# Patient Record
Sex: MALE | Race: WHITE | NOT HISPANIC OR LATINO | Employment: OTHER | URBAN - METROPOLITAN AREA
[De-identification: names, ages, dates, MRNs, and addresses within clinical notes are randomized per-mention and may not be internally consistent; named-entity substitution may affect disease eponyms.]

---

## 2023-10-01 ENCOUNTER — APPOINTMENT (OUTPATIENT)
Dept: CT IMAGING | Facility: HOSPITAL | Age: 78
DRG: 057 | End: 2023-10-01
Payer: COMMERCIAL

## 2023-10-01 ENCOUNTER — APPOINTMENT (EMERGENCY)
Dept: CT IMAGING | Facility: HOSPITAL | Age: 78
DRG: 057 | End: 2023-10-01
Payer: COMMERCIAL

## 2023-10-01 ENCOUNTER — HOSPITAL ENCOUNTER (INPATIENT)
Facility: HOSPITAL | Age: 78
LOS: 5 days | Discharge: HOME | DRG: 057 | End: 2023-10-07
Attending: STUDENT IN AN ORGANIZED HEALTH CARE EDUCATION/TRAINING PROGRAM | Admitting: INTERNAL MEDICINE
Payer: COMMERCIAL

## 2023-10-01 DIAGNOSIS — Z86.73 HISTORY OF CVA (CEREBROVASCULAR ACCIDENT): ICD-10-CM

## 2023-10-01 DIAGNOSIS — I65.09 VERTEBRAL ARTERY OCCLUSION: ICD-10-CM

## 2023-10-01 DIAGNOSIS — R47.81 SLURRED SPEECH: Primary | ICD-10-CM

## 2023-10-01 PROBLEM — E78.5 HYPERLIPIDEMIA: Status: ACTIVE | Noted: 2023-10-01

## 2023-10-01 PROBLEM — I50.20 HFREF (HEART FAILURE WITH REDUCED EJECTION FRACTION) (HCC): Status: ACTIVE | Noted: 2023-10-01

## 2023-10-01 PROBLEM — I10 ESSENTIAL HYPERTENSION: Status: ACTIVE | Noted: 2019-06-27

## 2023-10-01 PROBLEM — N18.9 CKD (CHRONIC KIDNEY DISEASE): Status: ACTIVE | Noted: 2023-10-01

## 2023-10-01 PROBLEM — R29.90 STROKE-LIKE SYMPTOMS: Status: ACTIVE | Noted: 2023-10-01

## 2023-10-01 PROBLEM — E03.9 HYPOTHYROIDISM: Status: ACTIVE | Noted: 2023-07-27

## 2023-10-01 LAB
ANION GAP SERPL CALCULATED.3IONS-SCNC: 7 MMOL/L
APTT PPP: 32 SECONDS (ref 23–37)
BUN SERPL-MCNC: 31 MG/DL (ref 5–25)
CALCIUM SERPL-MCNC: 9.1 MG/DL (ref 8.4–10.2)
CARDIAC TROPONIN I PNL SERPL HS: 39 NG/L
CHLORIDE SERPL-SCNC: 105 MMOL/L (ref 96–108)
CO2 SERPL-SCNC: 27 MMOL/L (ref 21–32)
CREAT SERPL-MCNC: 1.27 MG/DL (ref 0.6–1.3)
ERYTHROCYTE [DISTWIDTH] IN BLOOD BY AUTOMATED COUNT: 13.2 % (ref 11.6–15.1)
FLUAV RNA RESP QL NAA+PROBE: NEGATIVE
FLUBV RNA RESP QL NAA+PROBE: NEGATIVE
GFR SERPL CREATININE-BSD FRML MDRD: 53 ML/MIN/1.73SQ M
GLUCOSE SERPL-MCNC: 142 MG/DL (ref 65–140)
GLUCOSE SERPL-MCNC: 144 MG/DL (ref 65–140)
HCT VFR BLD AUTO: 43.9 % (ref 36.5–49.3)
HGB BLD-MCNC: 14.9 G/DL (ref 12–17)
INR PPP: 0.98 (ref 0.84–1.19)
MCH RBC QN AUTO: 29.4 PG (ref 26.8–34.3)
MCHC RBC AUTO-ENTMCNC: 33.9 G/DL (ref 31.4–37.4)
MCV RBC AUTO: 87 FL (ref 82–98)
PLATELET # BLD AUTO: 241 THOUSANDS/UL (ref 149–390)
PMV BLD AUTO: 10.1 FL (ref 8.9–12.7)
POTASSIUM SERPL-SCNC: 3.7 MMOL/L (ref 3.5–5.3)
PROTHROMBIN TIME: 13.6 SECONDS (ref 11.6–14.5)
RBC # BLD AUTO: 5.07 MILLION/UL (ref 3.88–5.62)
RSV RNA RESP QL NAA+PROBE: NEGATIVE
SARS-COV-2 RNA RESP QL NAA+PROBE: NEGATIVE
SODIUM SERPL-SCNC: 139 MMOL/L (ref 135–147)
TSH SERPL DL<=0.05 MIU/L-ACNC: 5.25 UIU/ML (ref 0.45–4.5)
WBC # BLD AUTO: 6.75 THOUSAND/UL (ref 4.31–10.16)

## 2023-10-01 PROCEDURE — 36415 COLL VENOUS BLD VENIPUNCTURE: CPT | Performed by: STUDENT IN AN ORGANIZED HEALTH CARE EDUCATION/TRAINING PROGRAM

## 2023-10-01 PROCEDURE — 0241U HB NFCT DS VIR RESP RNA 4 TRGT: CPT | Performed by: STUDENT IN AN ORGANIZED HEALTH CARE EDUCATION/TRAINING PROGRAM

## 2023-10-01 PROCEDURE — 84443 ASSAY THYROID STIM HORMONE: CPT | Performed by: STUDENT IN AN ORGANIZED HEALTH CARE EDUCATION/TRAINING PROGRAM

## 2023-10-01 PROCEDURE — 80048 BASIC METABOLIC PNL TOTAL CA: CPT | Performed by: STUDENT IN AN ORGANIZED HEALTH CARE EDUCATION/TRAINING PROGRAM

## 2023-10-01 PROCEDURE — 70496 CT ANGIOGRAPHY HEAD: CPT

## 2023-10-01 PROCEDURE — 93005 ELECTROCARDIOGRAM TRACING: CPT

## 2023-10-01 PROCEDURE — 85730 THROMBOPLASTIN TIME PARTIAL: CPT | Performed by: STUDENT IN AN ORGANIZED HEALTH CARE EDUCATION/TRAINING PROGRAM

## 2023-10-01 PROCEDURE — 85027 COMPLETE CBC AUTOMATED: CPT | Performed by: STUDENT IN AN ORGANIZED HEALTH CARE EDUCATION/TRAINING PROGRAM

## 2023-10-01 PROCEDURE — 85610 PROTHROMBIN TIME: CPT | Performed by: STUDENT IN AN ORGANIZED HEALTH CARE EDUCATION/TRAINING PROGRAM

## 2023-10-01 PROCEDURE — 82948 REAGENT STRIP/BLOOD GLUCOSE: CPT

## 2023-10-01 PROCEDURE — 99223 1ST HOSP IP/OBS HIGH 75: CPT | Performed by: STUDENT IN AN ORGANIZED HEALTH CARE EDUCATION/TRAINING PROGRAM

## 2023-10-01 PROCEDURE — 84484 ASSAY OF TROPONIN QUANT: CPT | Performed by: STUDENT IN AN ORGANIZED HEALTH CARE EDUCATION/TRAINING PROGRAM

## 2023-10-01 PROCEDURE — 70450 CT HEAD/BRAIN W/O DYE: CPT

## 2023-10-01 PROCEDURE — G1004 CDSM NDSC: HCPCS

## 2023-10-01 PROCEDURE — 70498 CT ANGIOGRAPHY NECK: CPT

## 2023-10-01 PROCEDURE — 99285 EMERGENCY DEPT VISIT HI MDM: CPT

## 2023-10-01 PROCEDURE — 99285 EMERGENCY DEPT VISIT HI MDM: CPT | Performed by: STUDENT IN AN ORGANIZED HEALTH CARE EDUCATION/TRAINING PROGRAM

## 2023-10-01 RX ORDER — HEPARIN SODIUM 5000 [USP'U]/ML
5000 INJECTION, SOLUTION INTRAVENOUS; SUBCUTANEOUS EVERY 8 HOURS SCHEDULED
Status: DISCONTINUED | OUTPATIENT
Start: 2023-10-01 | End: 2023-10-07

## 2023-10-01 RX ORDER — CLOPIDOGREL BISULFATE 75 MG/1
300 TABLET ORAL ONCE
Status: DISCONTINUED | OUTPATIENT
Start: 2023-10-01 | End: 2023-10-07 | Stop reason: HOSPADM

## 2023-10-01 RX ORDER — LEVOTHYROXINE SODIUM 0.07 MG/1
75 TABLET ORAL
Status: DISCONTINUED | OUTPATIENT
Start: 2023-10-02 | End: 2023-10-07 | Stop reason: HOSPADM

## 2023-10-01 RX ORDER — CLOPIDOGREL BISULFATE 75 MG/1
75 TABLET ORAL DAILY
Status: DISCONTINUED | OUTPATIENT
Start: 2023-10-02 | End: 2023-10-02

## 2023-10-01 RX ORDER — ASPIRIN 81 MG/1
81 TABLET, CHEWABLE ORAL DAILY
Status: DISCONTINUED | OUTPATIENT
Start: 2023-10-02 | End: 2023-10-07 | Stop reason: HOSPADM

## 2023-10-01 RX ORDER — LISINOPRIL AND HYDROCHLOROTHIAZIDE 25; 20 MG/1; MG/1
1 TABLET ORAL DAILY
COMMUNITY
Start: 2023-04-07

## 2023-10-01 RX ORDER — ATORVASTATIN CALCIUM 40 MG/1
40 TABLET, FILM COATED ORAL EVERY EVENING
Status: DISCONTINUED | OUTPATIENT
Start: 2023-10-01 | End: 2023-10-07 | Stop reason: HOSPADM

## 2023-10-01 RX ORDER — ASPIRIN 300 MG/1
300 SUPPOSITORY RECTAL ONCE
Status: COMPLETED | OUTPATIENT
Start: 2023-10-01 | End: 2023-10-02

## 2023-10-01 RX ORDER — FUROSEMIDE 20 MG/1
20 TABLET ORAL
COMMUNITY
Start: 2023-07-27

## 2023-10-01 RX ORDER — CARVEDILOL 3.12 MG/1
3.12 TABLET ORAL
COMMUNITY
Start: 2023-04-07

## 2023-10-01 RX ORDER — ASPIRIN 325 MG
325 TABLET ORAL ONCE
Status: DISCONTINUED | OUTPATIENT
Start: 2023-10-01 | End: 2023-10-07 | Stop reason: HOSPADM

## 2023-10-01 RX ORDER — LEVOTHYROXINE SODIUM 0.07 MG/1
75 TABLET ORAL
COMMUNITY
Start: 2023-07-27

## 2023-10-01 RX ORDER — AMLODIPINE BESYLATE 5 MG/1
5 TABLET ORAL
COMMUNITY
Start: 2023-04-07

## 2023-10-01 RX ADMIN — IOHEXOL 85 ML: 350 INJECTION, SOLUTION INTRAVENOUS at 21:04

## 2023-10-02 ENCOUNTER — APPOINTMENT (OUTPATIENT)
Dept: NON INVASIVE DIAGNOSTICS | Facility: HOSPITAL | Age: 78
DRG: 057 | End: 2023-10-02
Payer: COMMERCIAL

## 2023-10-02 ENCOUNTER — APPOINTMENT (OUTPATIENT)
Dept: MRI IMAGING | Facility: HOSPITAL | Age: 78
DRG: 057 | End: 2023-10-02
Payer: COMMERCIAL

## 2023-10-02 LAB
2HR DELTA HS TROPONIN: 0 NG/L
4HR DELTA HS TROPONIN: 1 NG/L
ANION GAP SERPL CALCULATED.3IONS-SCNC: 6 MMOL/L
AORTIC ROOT: 3.8 CM
AORTIC VALVE MEAN VELOCITY: 9.6 M/S
APICAL FOUR CHAMBER EJECTION FRACTION: 68 %
ASCENDING AORTA: 3.3 CM
ATRIAL RATE: 55 BPM
ATRIAL RATE: 59 BPM
AV LVOT MEAN GRADIENT: 3 MMHG
AV LVOT PEAK GRADIENT: 6 MMHG
AV MEAN GRADIENT: 4 MMHG
AV PEAK GRADIENT: 7 MMHG
AV REGURGITATION PRESSURE HALF TIME: 522 MS
AV VELOCITY RATIO: 0.87
BASOPHILS # BLD AUTO: 0.04 THOUSANDS/ÂΜL (ref 0–0.1)
BASOPHILS NFR BLD AUTO: 1 % (ref 0–1)
BUN SERPL-MCNC: 24 MG/DL (ref 5–25)
CALCIUM SERPL-MCNC: 9.1 MG/DL (ref 8.4–10.2)
CARDIAC TROPONIN I PNL SERPL HS: 35 NG/L
CARDIAC TROPONIN I PNL SERPL HS: 39 NG/L
CARDIAC TROPONIN I PNL SERPL HS: 40 NG/L
CHLORIDE SERPL-SCNC: 107 MMOL/L (ref 96–108)
CHOLEST SERPL-MCNC: 160 MG/DL
CO2 SERPL-SCNC: 26 MMOL/L (ref 21–32)
CREAT SERPL-MCNC: 1.14 MG/DL (ref 0.6–1.3)
DOP CALC AO PEAK VEL: 1.35 M/S
DOP CALC AO VTI: 27.31 CM
DOP CALC LVOT PEAK VEL VTI: 25.93 CM
DOP CALC LVOT PEAK VEL: 1.18 M/S
E WAVE DECELERATION TIME: 445 MS
EOSINOPHIL # BLD AUTO: 0.16 THOUSAND/ÂΜL (ref 0–0.61)
EOSINOPHIL NFR BLD AUTO: 2 % (ref 0–6)
ERYTHROCYTE [DISTWIDTH] IN BLOOD BY AUTOMATED COUNT: 13.1 % (ref 11.6–15.1)
EST. AVERAGE GLUCOSE BLD GHB EST-MCNC: 120 MG/DL
FRACTIONAL SHORTENING: 41 % (ref 28–44)
GFR SERPL CREATININE-BSD FRML MDRD: 60 ML/MIN/1.73SQ M
GLUCOSE P FAST SERPL-MCNC: 113 MG/DL (ref 65–99)
GLUCOSE SERPL-MCNC: 113 MG/DL (ref 65–140)
HBA1C MFR BLD: 5.8 %
HCT VFR BLD AUTO: 43.5 % (ref 36.5–49.3)
HDLC SERPL-MCNC: 41 MG/DL
HGB BLD-MCNC: 14.7 G/DL (ref 12–17)
IMM GRANULOCYTES # BLD AUTO: 0.03 THOUSAND/UL (ref 0–0.2)
IMM GRANULOCYTES NFR BLD AUTO: 0 % (ref 0–2)
INTERVENTRICULAR SEPTUM IN DIASTOLE (PARASTERNAL SHORT AXIS VIEW): 1.1 CM
INTERVENTRICULAR SEPTUM: 1.1 CM (ref 0.6–1.1)
LAAS-AP2: 25.9 CM2
LAAS-AP4: 21 CM2
LDLC SERPL CALC-MCNC: 103 MG/DL (ref 0–100)
LEFT ATRIUM AREA SYSTOLE SINGLE PLANE A4C: 21.5 CM2
LEFT ATRIUM SIZE: 4 CM
LEFT ATRIUM VOLUME (MOD BIPLANE): 76 ML
LEFT INTERNAL DIMENSION IN SYSTOLE: 3.2 CM (ref 2.1–4)
LEFT VENTRICULAR INTERNAL DIMENSION IN DIASTOLE: 5.4 CM (ref 3.5–6)
LEFT VENTRICULAR POSTERIOR WALL IN END DIASTOLE: 1 CM
LEFT VENTRICULAR STROKE VOLUME: 103 ML
LVSV (TEICH): 103 ML
LYMPHOCYTES # BLD AUTO: 1.44 THOUSANDS/ÂΜL (ref 0.6–4.47)
LYMPHOCYTES NFR BLD AUTO: 21 % (ref 14–44)
MCH RBC QN AUTO: 28.9 PG (ref 26.8–34.3)
MCHC RBC AUTO-ENTMCNC: 33.8 G/DL (ref 31.4–37.4)
MCV RBC AUTO: 86 FL (ref 82–98)
MONOCYTES # BLD AUTO: 0.84 THOUSAND/ÂΜL (ref 0.17–1.22)
MONOCYTES NFR BLD AUTO: 13 % (ref 4–12)
MV E'TISSUE VEL-SEP: 6 CM/S
MV PEAK A VEL: 1 M/S
MV PEAK E VEL: 69 CM/S
MV STENOSIS PRESSURE HALF TIME: 129 MS
MV VALVE AREA P 1/2 METHOD: 1.71 CM2
NEUTROPHILS # BLD AUTO: 4.22 THOUSANDS/ÂΜL (ref 1.85–7.62)
NEUTS SEG NFR BLD AUTO: 63 % (ref 43–75)
NRBC BLD AUTO-RTO: 0 /100 WBCS
P AXIS: 14 DEGREES
P AXIS: 20 DEGREES
PLATELET # BLD AUTO: 214 THOUSANDS/UL (ref 149–390)
PLATELET # BLD AUTO: 216 THOUSANDS/UL (ref 149–390)
PMV BLD AUTO: 10.3 FL (ref 8.9–12.7)
PMV BLD AUTO: 9.8 FL (ref 8.9–12.7)
POTASSIUM SERPL-SCNC: 3.5 MMOL/L (ref 3.5–5.3)
PR INTERVAL: 180 MS
PR INTERVAL: 184 MS
QRS AXIS: -13 DEGREES
QRS AXIS: -14 DEGREES
QRSD INTERVAL: 166 MS
QRSD INTERVAL: 178 MS
QT INTERVAL: 486 MS
QT INTERVAL: 506 MS
QTC INTERVAL: 481 MS
QTC INTERVAL: 484 MS
RBC # BLD AUTO: 5.09 MILLION/UL (ref 3.88–5.62)
RIGHT ATRIUM AREA SYSTOLE A4C: 16 CM2
RIGHT VENTRICLE ID DIMENSION: 3.6 CM
SL CV AV DECELERATION TIME RETROGRADE: 1801 MS
SL CV AV PEAK GRADIENT RETROGRADE: 45 MMHG
SL CV LEFT ATRIUM LENGTH A2C: 6.5 CM
SL CV LV EF: 50
SL CV PED ECHO LEFT VENTRICLE DIASTOLIC VOLUME (MOD BIPLANE) 2D: 143 ML
SL CV PED ECHO LEFT VENTRICLE SYSTOLIC VOLUME (MOD BIPLANE) 2D: 41 ML
SODIUM SERPL-SCNC: 139 MMOL/L (ref 135–147)
T WAVE AXIS: 164 DEGREES
T WAVE AXIS: 171 DEGREES
TR MAX PG: 17 MMHG
TR PEAK VELOCITY: 2.1 M/S
TRICUSPID ANNULAR PLANE SYSTOLIC EXCURSION: 2.4 CM
TRICUSPID VALVE PEAK REGURGITATION VELOCITY: 2.08 M/S
TRIGL SERPL-MCNC: 81 MG/DL
VENTRICULAR RATE: 55 BPM
VENTRICULAR RATE: 59 BPM
WBC # BLD AUTO: 6.73 THOUSAND/UL (ref 4.31–10.16)

## 2023-10-02 PROCEDURE — 83036 HEMOGLOBIN GLYCOSYLATED A1C: CPT | Performed by: STUDENT IN AN ORGANIZED HEALTH CARE EDUCATION/TRAINING PROGRAM

## 2023-10-02 PROCEDURE — 85025 COMPLETE CBC W/AUTO DIFF WBC: CPT | Performed by: STUDENT IN AN ORGANIZED HEALTH CARE EDUCATION/TRAINING PROGRAM

## 2023-10-02 PROCEDURE — 84484 ASSAY OF TROPONIN QUANT: CPT | Performed by: STUDENT IN AN ORGANIZED HEALTH CARE EDUCATION/TRAINING PROGRAM

## 2023-10-02 PROCEDURE — 83519 RIA NONANTIBODY: CPT | Performed by: NURSE PRACTITIONER

## 2023-10-02 PROCEDURE — 99232 SBSQ HOSP IP/OBS MODERATE 35: CPT

## 2023-10-02 PROCEDURE — NC001 PR NO CHARGE: Performed by: PSYCHIATRY & NEUROLOGY

## 2023-10-02 PROCEDURE — 97166 OT EVAL MOD COMPLEX 45 MIN: CPT

## 2023-10-02 PROCEDURE — 92610 EVALUATE SWALLOWING FUNCTION: CPT

## 2023-10-02 PROCEDURE — 70551 MRI BRAIN STEM W/O DYE: CPT

## 2023-10-02 PROCEDURE — 85049 AUTOMATED PLATELET COUNT: CPT | Performed by: STUDENT IN AN ORGANIZED HEALTH CARE EDUCATION/TRAINING PROGRAM

## 2023-10-02 PROCEDURE — 99222 1ST HOSP IP/OBS MODERATE 55: CPT | Performed by: PHYSICAL MEDICINE & REHABILITATION

## 2023-10-02 PROCEDURE — 97162 PT EVAL MOD COMPLEX 30 MIN: CPT

## 2023-10-02 PROCEDURE — 93306 TTE W/DOPPLER COMPLETE: CPT | Performed by: INTERNAL MEDICINE

## 2023-10-02 PROCEDURE — 93010 ELECTROCARDIOGRAM REPORT: CPT | Performed by: INTERNAL MEDICINE

## 2023-10-02 PROCEDURE — 36415 COLL VENOUS BLD VENIPUNCTURE: CPT | Performed by: STUDENT IN AN ORGANIZED HEALTH CARE EDUCATION/TRAINING PROGRAM

## 2023-10-02 PROCEDURE — 84484 ASSAY OF TROPONIN QUANT: CPT

## 2023-10-02 PROCEDURE — C9113 INJ PANTOPRAZOLE SODIUM, VIA: HCPCS

## 2023-10-02 PROCEDURE — 93306 TTE W/DOPPLER COMPLETE: CPT

## 2023-10-02 PROCEDURE — 99223 1ST HOSP IP/OBS HIGH 75: CPT | Performed by: PSYCHIATRY & NEUROLOGY

## 2023-10-02 PROCEDURE — 80061 LIPID PANEL: CPT | Performed by: STUDENT IN AN ORGANIZED HEALTH CARE EDUCATION/TRAINING PROGRAM

## 2023-10-02 PROCEDURE — 80048 BASIC METABOLIC PNL TOTAL CA: CPT | Performed by: STUDENT IN AN ORGANIZED HEALTH CARE EDUCATION/TRAINING PROGRAM

## 2023-10-02 PROCEDURE — 93005 ELECTROCARDIOGRAM TRACING: CPT

## 2023-10-02 PROCEDURE — 86255 FLUORESCENT ANTIBODY SCREEN: CPT | Performed by: NURSE PRACTITIONER

## 2023-10-02 RX ORDER — PYRIDOSTIGMINE BROMIDE 60 MG/1
30 TABLET ORAL 3 TIMES DAILY
Status: DISCONTINUED | OUTPATIENT
Start: 2023-10-02 | End: 2023-10-05

## 2023-10-02 RX ORDER — PANTOPRAZOLE SODIUM 40 MG/10ML
40 INJECTION, POWDER, LYOPHILIZED, FOR SOLUTION INTRAVENOUS ONCE
Status: COMPLETED | OUTPATIENT
Start: 2023-10-02 | End: 2023-10-02

## 2023-10-02 RX ORDER — DEXTROSE, SODIUM CHLORIDE, SODIUM LACTATE, POTASSIUM CHLORIDE, AND CALCIUM CHLORIDE 5; .6; .31; .03; .02 G/100ML; G/100ML; G/100ML; G/100ML; G/100ML
75 INJECTION, SOLUTION INTRAVENOUS CONTINUOUS
Status: DISCONTINUED | OUTPATIENT
Start: 2023-10-02 | End: 2023-10-06

## 2023-10-02 RX ADMIN — HEPARIN SODIUM 5000 UNITS: 5000 INJECTION INTRAVENOUS; SUBCUTANEOUS at 00:49

## 2023-10-02 RX ADMIN — HEPARIN SODIUM 5000 UNITS: 5000 INJECTION INTRAVENOUS; SUBCUTANEOUS at 21:00

## 2023-10-02 RX ADMIN — PANTOPRAZOLE SODIUM 40 MG: 40 INJECTION, POWDER, FOR SOLUTION INTRAVENOUS at 13:33

## 2023-10-02 RX ADMIN — HEPARIN SODIUM 5000 UNITS: 5000 INJECTION INTRAVENOUS; SUBCUTANEOUS at 15:07

## 2023-10-02 RX ADMIN — HEPARIN SODIUM 5000 UNITS: 5000 INJECTION INTRAVENOUS; SUBCUTANEOUS at 06:41

## 2023-10-02 RX ADMIN — ASPIRIN 300 MG: 300 SUPPOSITORY RECTAL at 00:04

## 2023-10-02 RX ADMIN — DEXTROSE, SODIUM CHLORIDE, SODIUM LACTATE, POTASSIUM CHLORIDE, AND CALCIUM CHLORIDE 75 ML/HR: 5; .6; .31; .03; .02 INJECTION, SOLUTION INTRAVENOUS at 20:13

## 2023-10-02 NOTE — PHYSICAL THERAPY NOTE
Physical Therapy Evaluation     Patient's Name: Diane Tejada    Admitting Diagnosis  Slurred speech [R47.81]    Problem List  Patient Active Problem List   Diagnosis    Stroke-like symptoms    CKD (chronic kidney disease)    Essential hypertension    History of CVA (cerebrovascular accident)    Hypothyroidism    Hyperlipidemia    HFrEF (heart failure with reduced ejection fraction) ([address])     Past Medical History  Past Medical History:   Diagnosis Date    Hypertension     TIA (transient ischemic attack)      Past Surgical History  History reviewed. No pertinent surgical history. 10/02/23 6139   PT Last Visit   PT Visit Date 10/02/23   Note Type   Note type Evaluation   Pain Assessment   Pain Assessment Tool 0-10   Pain Score No Pain   Restrictions/Precautions   Weight Bearing Precautions Per Order No   Other Precautions Chair Alarm; Bed Alarm;Multiple lines;Telemetry; Fall Risk   Home Living   Type of [address] [place] [address] Two level; Able to live on main level with bedroom/bathroom; Performs ADLs on one level  (1 BK)   Bathroom Shower/Tub Tub/shower unit   Bathroom Toilet Standard   Bathroom Equipment Grab bars in shower   [address] Other (Comment)  (none per patient)   Additional Comments Pt ambulates without an AD. Prior Function   Level of [place] Independent with functional mobility; Independent with ADLs; Independent with IADLS   Lives With Alone   Receives Help From Family  (daughter; family plans to visits from [place])   IADLs Independent with driving; Independent with meal prep; Independent with medication management   Falls in the last 6 months 0   Vocational Retired   General   Family/Caregiver Present No   Cognition   Overall Cognitive Status WFL   Arousal/Participation Alert   Orientation Level Oriented X4   Memory Within functional limits   Following Commands Follows all commands and directions without difficulty   Comments Pt agreeable to PT. Subjective   Subjective "I haven't been up yet."   RLE Assessment   RLE Assessment X   Strength RLE   RLE Overall Strength 4/5   LLE Assessment   LLE Assessment X   Strength LLE   LLE Overall Strength 4/5   Light Touch   RLE Light Touch Grossly intact   LLE Light Touch Grossly intact   Bed Mobility   Supine to Sit 5  Supervision   Additional items Assist x 1;HOB elevated; Increased time required;Verbal cues   Transfers   Sit to Stand 5  Supervision   Additional items Assist x 1; Increased time required;Verbal cues   Stand to Sit 5  Supervision   Additional items Assist x 1; Increased time required;Verbal cues   Ambulation/Elevation   Gait pattern Short stride; Shuffling  (increased lateral trunk sway; no overt LOB)   Gait Assistance   (CG assist)   Additional items Assist x 1;Verbal cues   Assistive Device None   Distance 250 feet   Balance   Static Sitting Good   Dynamic Sitting Fair +   Static Standing Fair   Dynamic Standing Fair -   Ambulatory Fair -   Endurance Deficit   Endurance Deficit Yes   Endurance Deficit Description decreased activity tolerance   Activity Tolerance   Activity Tolerance Patient tolerated treatment well   Medical Staff Made Aware OT Roseana  (Co-evaluation performed with OT secondary to complex medical condition of patient and regression of functional status from baseline. PT/OT goals were addressed separately.)   Assessment   Prognosis Good   Problem List Decreased strength;Decreased endurance; Impaired balance;Decreased mobility   Assessment Pt is 77year old male seen for PT evaluation s/p admit to [address] [facility] on 10/1/2023 with Stroke-like symptoms. PT consulted to assess pt's functional mobility and discharge needs. Order placed for PT evaluation and treatment, with up and out of bed as tolerated order.  Comorbidities affecting pt's physical performance at time of assessment include CKD, essential hypertension, history of CVA, hypothyroidism, hyperlipidemia, and heart failure with reduced ejection fraction. Prior to hospitalization, pt was independent with all functional mobility without an AD. Pt ambulates unrestricted distances on all terrain and elevations. Pt resides alone, in a two level house, but is able to stay on the first floor, with one step to enter. Personal factors affecting pt at time of initial evaluation include lives in a two story house, step to enter home, inability to navigate level surfaces without external assistance, limited home support, and difficulty performing ADLs, and IADLs. Please find objective findings from PT assessment regarding body systems outlined above with impairments and limitations including weakness, impaired balance, decreased endurance, gait deviations, decreased activity tolerance, decreased functional mobility tolerance, and fall risk. The following objective measures were performed on initial evaluation Barthel Index: 60/100, Modified Lory: 3 (moderate disability) and AM-PAC 6-Clicks: 44/08. Pt's clinical presentation is currently evolving seen in pt's presentation of need for ongoing medical management/monitoring, pt is a fall risk, and pt requires cues and assist for safety with functional mobility. Pt to benefit from continued PT treatment to address deficits as defined above and maximize pt's level of function and independence with mobility. From a PT standpoint, recommendation at time of discharge would be home with family support and outpatient PT pending pt's progress in order to facilitate return to prior level of function.    Barriers to Discharge Decreased caregiver support   Goals   STG Expiration Date 10/12/23   Short Term Goal #1 In 10 days: Increase bilateral LE strength 1/2 grade to facilitate independent mobility, Perform all bed mobility tasks modified independent to decrease caregiver burden, Perform all transfers modified independent to improve independence, Ambulate > 250 ft. with least restrictive assistive device modified independent w/o LOB and w/ normalized gait pattern 100% of the time, Navigate 1 step modified independent without handrail to facilitate return to previous living environment and Increase all balance 1/2 grade to decrease risk for falls   Plan   Treatment/Interventions Functional transfer training;LE strengthening/ROM; Elevations; Therapeutic exercise; Endurance training;Patient/family training;Bed mobility;Gait training;OT   PT Frequency 1-2x/wk   Recommendation   PT Discharge Recommendation Home with outpatient rehabilitation   AM-PAC Basic Mobility Inpatient   Turning in Flat Bed Without Bedrails 3   Lying on Back to Sitting on Edge of Flat Bed Without Bedrails 3   Moving Bed to Chair 3   Standing Up From Chair Using Arms 3   Walk in Room 3   Climb 3-5 Stairs With Railing 3   Basic Mobility Inpatient Raw Score 18   Basic Mobility Standardized Score 41.05   Highest Level Of Mobility   JH-HLM Goal 6: Walk 10 steps or more   JH-HLM Achieved 8: Walk 250 feet ot more   Modified [place] Scale   Modified Lory Scale 3   Barthel Index   Feeding 5   Bathing 0   Grooming Score 5   Dressing Score 5   Bladder Score 10   Bowels Score 10   Toilet Use Score 5   Transfers (Bed/Chair) Score 10   Mobility (Level Surface) Score 10   Stairs Score 0   Barthel Index Score 60     PT Evaluation Time: 7346-4274    Surinder Wadsworth, PT, DPT

## 2023-10-02 NOTE — QUICK NOTE
Stroke Alert Note   Amy Mullen 77 y.o. male  MRN: [id number]   Unit/Bed#: ED 08 Encounter: [id number]     Stroke alert text received at: 9:22pm  Neurology response by phone was immediate    78M with history of prior stroke, HTN, HLD, CHF, CKD, was a stroke alert after developing acute onset slurred speech, difficulty swallowing, and a small area of numbness around his right eye at 8:15pm while eating dinner. Pt and family report that numbness resolved, and dysarthria and dysphagia are improving. Per ED provider, pt/family reports speech is still a little slurred, but the ED provider did not appreciate any definite dysarthria on exam.    /84 on arrival, decreased to 162/66 less than 20 minutes later without treatment. Not on any antiplatelet medications at baseline. NIHSSS 1 for mild dysarthria. CT head wo: unremarkable  CTA head/neck: "1.  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 2.  Mild stenosis at both terminal ICAs."  I also did not see opacification of the right PICA. Pt passed initial nursing swallow evaluation. IV TNK was not given due to improving/nondiabling symptoms/low NIHSS. Per my discussion with radiology, he favored the right vertebral occlusion to be acute, but could not be sure, however, his symptoms do not fit with an acute vertebral artery occlusion that appears to also occlude PICA, with delayed collateral flow suspected. - Admit on stroke pathway  - Recommend loading with aspirin 325mg once, then continuing 81 mg daily, and with plavix 300mg once, followed by 75mg daily. - start lipitor 40mg Qday  - MRI brain wo, echo, lipid panel, HbA1c  - permissive HTN to 220/110 for 24 hours, monitor on telemetry  - normothermia, euglycemia  - avoid hypotonic fluids. Lyn Lopez MD    Addendum:   At midnight, I was informed by the admitting team that his dysphagia had worsened significantly, that he was made NPO prior to taking the aspirin and plavix loads, and had been given MN aspirin only. His dysarthria was unchanged at that time per pt and daughter at bedside, and the primary team provider reported no other abnormalities on his exam.  He was sent for a repeat CT head to confirm there were no changes. In the meantime, I asked them to confirm the last known well/time of onset (not emphasized previously due to his resolving and at the time of the stroke alert mild symptoms). I also asked for a stat read on the CT head by radiology. I reviewed the CT and spoke to radiology, and there were no clear changes. Unfortunately, by the time all of the above was completed, he was no longer in the window for TNK. Note that outside records became available in care everywhere and a CTA head/neck report from the time of his stroke in 6/2019 describes the right vertebral artery being occluded at about the same level at that time too. Continue with above plan except for holding PO medications until he is cleared to swallow.

## 2023-10-02 NOTE — ASSESSMENT & PLAN NOTE
Stroke alert presenting with slurred speech, dysphagia and numbness near right eye. Symptoms improving. Has mildly slurred speech. Hx of TIA. · Stroke alert, neurology following. Appreciate recommendations. Recommended aspirin and Plavix load continuing with aspirin 81 mg daily followed by Plavix 75 mg daily  · Reports hx of stroke 4 years ago, not on asa or plavix currently.    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 given dysphagia  · Neuro consulted, will likely start myasthenia's gravis work-up tomorrow given patient has no stroke

## 2023-10-02 NOTE — ASSESSMENT & PLAN NOTE
77 y.o.  male with prior stroke 2019, HTN, HLD, HFrEF, hypothyroid, and CKD who presented to [facility] 10/1/23 with sudden onset slurred speech, difficulty swallowing and a small area of numbness around his right eye. Per previous neurology provider, in the ED, patient and family reported that the numbness resolved and dysarthria/dysphagia were improving. Stroke alert initiated. NIHSS 1 for mild dysarthria. /84, . CTH/CTA notable for right V3 and V4 occlusion (also noted on CTA report 2019). 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 He was not a candidate for TNK as his symptoms were improving with low NIH. Per chart review, pt was recommended to be loaded with  mg and Plavix 300 mg but unfortunately failed swallow evaluation. Later that evening, pt had significant worsening of his dysphagia per chart review. Pt was made NPO, hence only received  mg NM. Repeat CTH stable. Neurodiagnostics   - 10/1/23 CTH/CTA:   "1.  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 Mild stenosis at both terminal ICAs"  - 10/1/23 Repeat CTH:  "No acute intracranial abnormality."  - 10/2/23 MRI brain without contrast:  "1. No acute infarction, edema, or mass effect. 2. Mild chronic microangiopathic ischemic changes. "  - Echo 10/2/23:   "Echocardiogram reviewed, EF 50%, aortic valve sclerosis, atria size normal B/L, limited bubble study negative but probably nondiagnostic due to technical difficulties. "  - Labs   - Lipid panel: total cholesterol 160, TG 81, HDL 41,   - TSH 5.2, free T4 1.03  - A1c 5.8    Etiology of symptoms is unclear, differential includes MRI negative stroke vs myasthenia gravis. Plan  · Pt cleared for puree diet 10/5/23. Increase mestinon to 60 mg TID.  Attending neurologist discussed proper medication administration (taking medication prior to eating)  • Consider  mg NC until pt cleared for PO medications   • Atorvastatin 40 mg daily when able   • Goal normotension. • Euglycemic, normothermic goal  • Continue telemetry  • PT/OT/ST  • Stroke education  • Ach receptor binding/blocking/modualting pending. MUSK pending   • Continue to monitor and notify neurology with any changes. - Medical management andcorrection of any metabolic or infectious disturbances per primary service.

## 2023-10-02 NOTE — STROKE DOCUMENTATION
Attempted PO medication, small sip of water prior to administration and pt unable to tolerate PO liquids at this time. Previously able to swallow water however unable to at this time. No change in mental status at this time however does fail a dysphagia assessment at this time. Provider made aware.

## 2023-10-02 NOTE — OCCUPATIONAL THERAPY NOTE
Occupational Therapy Evaluation     Patient Name: Milly Zendejas  XPKCI'D Date: 10/2/2023      Problem List  Principal Problem:    Stroke-like symptoms  Active Problems:    CKD (chronic kidney disease)    Essential hypertension    History of CVA (cerebrovascular accident)    Hypothyroidism    Hyperlipidemia    HFrEF (heart failure with reduced ejection fraction) ([address])    Past Medical History  Past Medical History:   Diagnosis Date    Hypertension     TIA (transient ischemic attack)      Past Surgical History  History reviewed. No pertinent surgical history. 10/02/23 0825   OT Last Visit   OT Visit Date 10/02/23   Note Type   Note type Evaluation   Pain Assessment   Pain Assessment Tool 0-10   Pain Score No Pain   Restrictions/Precautions   Weight Bearing Precautions Per Order No   Other Precautions Chair Alarm; Bed Alarm;Multiple lines;Telemetry; Fall Risk   Home Living   Type of [address] [place] [address] Two level; Able to live on main level with bedroom/bathroom; Performs ADLs on one level  (1 BK)   Bathroom Shower/Tub Tub/shower unit   Bathroom Toilet Standard   Bathroom Equipment Grab bars in shower   [address]   (none)   Prior Function   Level of [place] Independent with ADLs; Independent with functional mobility; Independent with IADLS  (pt does not use AD at baseline)   Lives With Alone   Receives Help From Family  (daughter, family plans to visit from [place])   IADLs Independent with driving; Independent with meal prep; Independent with medication management   Falls in the last 6 months 0   Vocational Retired   General   Family/Caregiver Present No   Subjective   Subjective Pt agreeable to therapy evaluation   ADL   Where Assessed Other (Comment)   Grooming Assistance 5  Supervision/Setup   Grooming Deficit Setup   UB Dressing Assistance 5  Supervision/Setup   UB Dressing Deficit Setup;Supervision/safety  (while seated)   LB Dressing Assistance 5 Supervision/Setup   LB Dressing Deficit Setup;Don/doff R sock; Don/doff L sock   Bed Mobility   Supine to Sit 5  Supervision   Additional items Assist x 1;HOB elevated; Bedrails   Sit to Supine 5  Supervision   Additional items Assist x 1;HOB elevated; Bedrails   Transfers   Sit to Stand 5  Supervision   Additional items Assist x 1; Increased time required;Verbal cues   Stand to Sit 5  Supervision   Additional items Assist x 1; Increased time required;Verbal cues   Stand pivot 5  Supervision   Additional items Increased time required   Functional Mobility   Functional Mobility 5  Supervision   Additional Comments assist x1   Additional items   (without AD)   Activity Tolerance   Activity Tolerance Patient tolerated treatment well   Medical Staff Made Aware Say Bartlett PT  (Pt seen for co-evaluation with Physical Therapist due to pt's medical complexity, functional limitations and limited activity tolerance.)   RUE Assessment   RUE Assessment WFL   LUE Assessment   LUE Assessment WFL   Hand Function   Gross Motor Coordination Functional   Fine Motor Coordination Functional   Sensation   Light Touch No apparent deficits   Psychosocial   Psychosocial (WDL) WDL   Cognition   Overall Cognitive Status WFL   Arousal/Participation Alert; Cooperative   Attention Within functional limits   Orientation Level Oriented X4   Memory Within functional limits   Following Commands Follows all commands and directions without difficulty   Assessment   Limitation Decreased endurance;Decreased high-level ADLs   Assessment Pt is a 77 y.o. male seen for OT evaluation s/p admit to [facility] on 10/1/2023 w/ Stroke-like symptoms. Comorbidities affecting pt's functional performance at time of assessment include: HTN, previous surgery, CHF and hx of CVA. Personal factors affecting pt at time of IE include:steps to enter environment, limited home support, difficulty performing IADLS  and health management .  Prior to admission, pt was independent with ADLs and functional mobility without use of AD. Upon evaluation: Pt requires supervision during mobility and ADLs 2* the following deficits impacting occupational performance: decreased balance and decreased tolerance. Pt to benefit from continued skilled OT tx while in the hospital to address deficits as defined above and maximize level of functional independence w ADL's and functional mobility. Occupational Performance areas to address include: grooming, bathing/shower, toilet hygiene, dressing and functional mobility. From OT standpoint, recommendation at time of d/c would be no further OT needs. Plan   Treatment Interventions ADL retraining;Functional transfer training; Endurance training;Patient/family training   Goal Expiration Date 10/16/23   OT Treatment Day 0   OT Frequency 1-2x/wk   Recommendation   OT Discharge Recommendation No rehabilitation needs   Additional Comments  The patient's raw score on the AM-PAC Daily Activity Inpatient Short Form is 20. A raw score of greater than or equal to 19 suggests the patient may benefit from discharge to home. Please refer to the recommendation of the Occupational Therapist for safe discharge planning.    AM-PAC Daily Activity Inpatient   Lower Body Dressing 3   Bathing 3   Toileting 3   Upper Body Dressing 3   Grooming 4   Eating 4   Daily Activity Raw Score 20   Daily Activity Standardized Score (Calc for Raw Score >=11) 42.03   AM-PAC Applied Cognition Inpatient   Following a Speech/Presentation 4   Understanding Ordinary Conversation 4   Taking Medications 4   Remembering Where Things Are Placed or Put Away 4   Remembering List of 4-5 Errands 4   Taking Care of Complicated Tasks 4   Applied Cognition Raw Score 24   Applied Cognition Standardized Score 62.21       Goals: to be met by 10/16/23    Patient will perform functional bed mobility with modified independence, with HOB flat, no rails  Patient will perform functional transfers with modified independence using LRAD in preparation for ADL tasks, with good safety awareness  Patient will perform UB dressing task with modified independence while seated, with set up  Patient will perform LB dressing task with modified independence  Patient will perform toilet transfer with modified independence  Patient will perform toileting with modified independence, including hygiene and clothing management   Patient will improve activity tolerance by participating in 20 minutes of session at a time in preparation for participation in ADL tasks         Freddy Vitale, OTR/L

## 2023-10-02 NOTE — PLAN OF CARE
Recommendations:   Diet: NPO   Meds: non-oral if possible   Feeding assistance: NA  Frequent Oral care: 2-4x/day  Aspiration precautions and compensatory swallowing strategies: upright posture**, suction set up, frequent oral care, reflux precautions   Other Recommendations/ considerations: SLP will continue to follow to for re-evaluation/determien safety of oral intake and/or need for further instrumental  assessment (MBS) x 3-5

## 2023-10-02 NOTE — PLAN OF CARE
Problem: PHYSICAL THERAPY ADULT  Goal: Performs mobility at highest level of function for planned discharge setting. See evaluation for individualized goals. Description: Treatment/Interventions: Functional transfer training, LE strengthening/ROM, Elevations, Therapeutic exercise, Endurance training, Patient/family training, Bed mobility, Gait training, OT          See flowsheet documentation for full assessment, interventions and recommendations. Note: Prognosis: Good  Problem List: Decreased strength, Decreased endurance, Impaired balance, Decreased mobility  Assessment: Pt is 77year old male seen for PT evaluation s/p admit to [id number] [place] [address] on 10/1/2023 with Stroke-like symptoms. PT consulted to assess pt's functional mobility and discharge needs. Order placed for PT evaluation and treatment, with up and out of bed as tolerated order. Comorbidities affecting pt's physical performance at time of assessment include CKD, essential hypertension, history of CVA, hypothyroidism, hyperlipidemia, and heart failure with reduced ejection fraction. Prior to hospitalization, pt was independent with all functional mobility without an AD. Pt ambulates unrestricted distances on all terrain and elevations. Pt resides alone, in a two level house, but is able to stay on the first floor, with one step to enter. Personal factors affecting pt at time of initial evaluation include lives in a two story house, step to enter home, inability to navigate level surfaces without external assistance, limited home support, and difficulty performing ADLs, and IADLs. Please find objective findings from PT assessment regarding body systems outlined above with impairments and limitations including weakness, impaired balance, decreased endurance, gait deviations, decreased activity tolerance, decreased functional mobility tolerance, and fall risk.  The following objective measures were performed on initial evaluation Barthel Index: 60/100, Modified Lory: 3 (moderate disability) and AM-PAC 6-Clicks: 80/64. Pt's clinical presentation is currently evolving seen in pt's presentation of need for ongoing medical management/monitoring, pt is a fall risk, and pt requires cues and assist for safety with functional mobility. Pt to benefit from continued PT treatment to address deficits as defined above and maximize pt's level of function and independence with mobility. From a PT standpoint, recommendation at time of discharge would be home with family support and outpatient PT pending pt's progress in order to facilitate return to prior level of function. Barriers to Discharge: Decreased caregiver support     PT Discharge Recommendation: Home with outpatient rehabilitation    See flowsheet documentation for full assessment.

## 2023-10-02 NOTE — H&P
[address]  H&P  Name: Ese Rudd  MRN: [id number]  Unit/Bed#: ED 08 I Date of Admission: 10/1/2023   Date of Service: 10/1/2023 I Hospital Day: 0    Assessment/Plan   * Stroke-like symptoms  Assessment & Plan  Stroke alert presenting with slurred speech, dysphagia and numbness near right eye. Symptoms improving. Has mildly slurred speech. Hx of TIA. · Stroke alert, neurology following. Appreciate recommendations. Recommended aspirin and Plavix load continuing with aspirin 81 mg daily followed by Plavix 75 mg daily  · Reports hx of stroke 4 years ago, not on asa or plavix currently. 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 No other focal neurological deficits or changes. Will order repeat stat head CT  · Monitor on telemetry  · PT/OT/speech      Hypothyroidism  Assessment & Plan  Repeat TSH pending  Pt reports not currently on levothyroxine    History of CVA (cerebrovascular accident)  Assessment & Plan  Patient has a history of CVA, does not appear to be on aspirin or Plavix. Stroke pathway as above    Essential hypertension  Assessment & Plan  Pressure on arrival 180s, repeat 160s without intervention. Hold home dose Coreg, amlodipine, lisinopril and HCTZ  We will allow for permissive hypertension as above  Monitor vitals closely    CKD (chronic kidney disease)  Assessment & Plan  Lab Results   Component Value Date    EGFR 53 10/01/2023    CREATININE 1.27 10/01/2023   Per chart review creatinine appears to be at baseline  Avoid nephrotoxic agents  Monitor creatinine with a.m.  BMP           VTE Pharmacologic Prophylaxis: VTE Score: 8 Moderate Risk (Score 3-4) - Pharmacological DVT Prophylaxis Ordered: heparin. Code Status: Level 1 - Full Code   Discussion with family: PT. Anticipated Length of Stay: Patient will be admitted on an observation basis with an anticipated length of stay of less than 2 midnights secondary to stroke pathway . Total Time Spent on Date of Encounter in care of patient: 41 mins. This time was spent on one or more of the following: performing physical exam; counseling and coordination of care; obtaining or reviewing history; documenting in the medical record; reviewing/ordering tests, medications or procedures; communicating with other healthcare professionals and discussing with patient's family/caregivers. Chief Complaint: Slurred speech, dysphagia    History of Present Illness:  Yudy Lee is a 77 y.o. male with a PMH of hypertension, history of TIA, history of heart failure with reduced EF, CKD 3 who presents with speech, dysphagia and numbness near right eye. Symptoms started improving on its own. Slurred speech is present per pt and daughter, has not acutely worsened. Per daughter, pts last stroke 4 years ago presented the same way. Patient was stroke alert in the ED, admitted for further management and work-up for stroke pathway    Review of Systems:  Review of Systems   Neurological: Positive for speech difficulty. All other systems reviewed and are negative. Past Medical and Surgical History:   Past Medical History:   Diagnosis Date   • Hypertension    • TIA (transient ischemic attack)        History reviewed. No pertinent surgical history. Meds/Allergies:  Prior to Admission medications    Not on File     I have reviewed home medications with patient personally.     Allergies: No Known Allergies    Social History:  Marital Status: Single     Substance Use History:   Social History     Substance and Sexual Activity   Alcohol Use Never     Social History Tobacco Use   Smoking Status Never   Smokeless Tobacco Never     Social History     Substance and Sexual Activity   Drug Use Never       Family History:  History reviewed. No pertinent family history. Physical Exam:     Vitals:   Blood Pressure: 169/70 (10/01/23 2315)  Pulse: (!) 54 (10/01/23 2315)  Temperature: 97.7 °F (36.5 °C) (10/01/23 2033)  Temp Source: Oral (10/01/23 2033)  Respirations: 18 (10/01/23 2315)  Weight - Scale: 98.5 kg (217 lb 2.5 oz) (10/01/23 2051)  SpO2: 95 % (10/01/23 2315)    Physical Exam  Vitals reviewed. Constitutional:       General: He is not in acute distress. Appearance: He is well-developed. He is not diaphoretic. HENT:      Head: Normocephalic and atraumatic. Mouth/Throat:      Pharynx: No oropharyngeal exudate. Eyes:      General: No scleral icterus. Extraocular Movements: Extraocular movements intact. Conjunctiva/sclera: Conjunctivae normal.   Neck:      Vascular: No JVD. Trachea: No tracheal deviation. Cardiovascular:      Rate and Rhythm: Normal rate and regular rhythm. Heart sounds: No murmur heard. No friction rub. No gallop. Pulmonary:      Effort: Pulmonary effort is normal. No respiratory distress. Breath sounds: No stridor. No wheezing. Abdominal:      General: There is no distension. Palpations: Abdomen is soft. There is no mass. Tenderness: There is no abdominal tenderness. There is no right CVA tenderness or left CVA tenderness. Musculoskeletal:         General: No tenderness. Normal range of motion. Right lower leg: No edema. Left lower leg: No edema. Skin:     General: Skin is warm and dry. Coloration: Skin is not pale. Findings: No erythema. Neurological:      Mental Status: He is oriented to person, place, and time. Comments: Mild dysarthria noted   Psychiatric:         Behavior: Behavior normal.         Thought Content:  Thought content normal.          Additional Data: Lab Results:  Results from last 7 days   Lab Units 10/01/23  2100   WBC Thousand/uL 6.75   HEMOGLOBIN g/dL 14.9   HEMATOCRIT % 43.9   PLATELETS Thousands/uL 241     Results from last 7 days   Lab Units 10/01/23  2100   SODIUM mmol/L 139   POTASSIUM mmol/L 3.7   CHLORIDE mmol/L 105   CO2 mmol/L 27   BUN mg/dL 31*   CREATININE mg/dL 1.27   ANION GAP mmol/L 7   CALCIUM mg/dL 9.1   GLUCOSE RANDOM mg/dL 142*     Results from last 7 days   Lab Units 10/01/23  2100   INR  0.98     Results from last 7 days   Lab Units 10/01/23  2043   POC GLUCOSE mg/dl 144*               Lines/Drains:  Invasive Devices     Peripheral Intravenous Line  Duration           Peripheral IV 10/01/23 Left Antecubital <1 day    Peripheral IV 10/01/23 Right;Ventral (anterior) Forearm <1 day                    Imaging: Reviewed radiology reports from this admission including: CT head  CTA stroke alert (head/neck)   Final Result by Governor Snellen, MD (10/01 2141)      1. 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 Mild stenosis at both terminal ICAs      I personally discussed impression 1 with [facility] 10/1/23 9:20 PM            Workstation performed: [id number]         CT stroke alert brain   Final Result by Governor Snellen, MD (10/01 2141)      1. 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 Mild stenosis at both terminal ICAs      I personally discussed impression 1 with [facility] 10/1/23 9:20 PM            Workstation performed: [id number]         MRI Inpatient Order    (Results Pending)   CT head wo contrast    (Results Pending)       EKG and Other Studies Reviewed on Admission:   · EKG: NSR. HR 84.    ** Please Note: This note has been constructed using a voice recognition system.  **

## 2023-10-02 NOTE — CASE MANAGEMENT
Case Management Assessment & Discharge Planning Note    Patient name Marifer Elise  Location ED 08/ED 08 MRN [id number]  : 1945 Date 10/2/2023       Current Admission Date: 10/1/2023  Current Admission Diagnosis:Stroke-like symptoms   Patient Active Problem List    Diagnosis Date Noted   • Stroke-like symptoms 10/01/2023   • CKD (chronic kidney disease) 10/01/2023   • Hyperlipidemia 10/01/2023   • HFrEF (heart failure with reduced ejection fraction) ([address]) 10/01/2023   • Hypothyroidism 2023   • Essential hypertension 2019   • History of CVA (cerebrovascular accident) 2019      LOS (days): 0  Geometric Mean LOS (GMLOS) (days):   Days to GMLOS:     OBJECTIVE:              Current admission status: Observation       Preferred Pharmacy: No Pharmacies Listed  Primary Care Provider: No primary care provider on file. Primary Insurance: [facility]  Secondary Insurance: MEDICARE MISC REPLACEMENT    ASSESSMENT:  Active Health Care Proxies     DulceMonse Representative - Daughter   Primary Phone: [phone] (Mobile)               Advance Directives  Does patient have a [address]?: No  Was patient offered paperwork?: Yes (not interested)  Does patient currently have a Health Care decision maker?: Yes, please see Health Care Proxy section  Does patient have Advance Directives?: No  Was patient offered paperwork?: Yes         Readmission Root Cause  30 Day Readmission: No    Patient Information  Admitted from[de-identified] Home  Mental Status: Alert  During Assessment patient was accompanied by: Not accompanied during assessment  Assessment information provided by[de-identified] Patient  Primary Caregiver: Self  Support Systems: [facility] [address] of Residence:  [facility]  What city do you live in?: [address] entry access options. Select all that apply.: Stairs  Number of steps to enter home. : 1  Do the steps have railings?: No  Type of Current Residence: 2 story home  Upon entering residence, is there a bedroom on the main floor (no further steps)?: Yes  Upon entering residence, is there a bathroom on the main floor (no further steps)?: Yes  In the last 12 months, was there a time when you were not able to pay the mortgage or rent on time?: No  In the last 12 months, how many places have you lived?: 2  In the last 12 months, was there a time when you did not have a steady place to sleep or slept in a shelter (including now)?: No  Homeless/housing insecurity resource given?: No  Living Arrangements: Lives Alone  Is patient a ?: Yes  Is patient active with [facility])?: Yes  Is patient service connected?: Yes    Activities of Daily Living Prior to Admission  Functional Status: Independent  Completes ADLs independently?: Yes  Ambulates independently?: Yes  Does patient use assisted devices?: No  Does patient currently own DME?: No  Does patient have a history of Outpatient Therapy (PT/OT)?: Yes  Does the patient have a history of Short-Term Rehab?: No  Does patient have a history of HHC?: No  Does patient currently have [facility]?: No         Patient Information Continued  Income Source: Employed  Does patient have prescription coverage?: Yes  Within the past 12 months, you worried that your food would run out before you got the money to buy more.: Never true  Within the past 12 months, the food you bought just didn't last and you didn't have money to get more.: Never true  Food insecurity resource given?: No  Does patient receive dialysis treatments?: No  Does patient have a history of substance abuse?: No  Does patient have a history of Mental Health Diagnosis?: No         Means of Transportation  Means of Transport to Appts[de-identified] Drives Self  In the past 12 months, has lack of transportation kept you from medical appointments or from getting medications?: No  In the past 12 months, has lack of transportation kept you from meetings, work, or from getting things needed for daily living?: No  Was application for public transport provided?: N/A        DISCHARGE DETAILS:    Discharge planning discussed with[de-identified] Patient  Freedom of Choice: Yes  Comments - Freedom of Choice: DC needs uncertain at this time  CM contacted family/caregiver?: No- see comments                  Requested 1367  Monique          Is the patient interested in [address] at discharge?: No    DME Referral Provided  Referral made for DME?: No    Other Referral/Resources/Interventions Provided:  Referral Comments: DC needs uncertain at this time         Treatment Team Recommendation: Home  Discharge Destination Plan[de-identified] Home  Transport at Discharge : Family, Automobile

## 2023-10-02 NOTE — PLAN OF CARE
Problem: OCCUPATIONAL THERAPY ADULT  Goal: Performs self-care activities at highest level of function for planned discharge setting. See evaluation for individualized goals. Description: Treatment Interventions: ADL retraining, Functional transfer training, Endurance training, Patient/family training          See flowsheet documentation for full assessment, interventions and recommendations. Outcome: Progressing  Note: Limitation: Decreased endurance, Decreased high-level ADLs     Assessment: Pt is a 77 y.o. male seen for OT evaluation s/p admit to [facility] on 10/1/2023 w/ Stroke-like symptoms. Comorbidities affecting pt's functional performance at time of assessment include: HTN, previous surgery, CHF and hx of CVA. Personal factors affecting pt at time of IE include:steps to enter environment, limited home support, difficulty performing IADLS  and health management . Prior to admission, pt was independent with ADLs and functional mobility without use of AD. Upon evaluation: Pt requires supervision during mobility and ADLs 2* the following deficits impacting occupational performance: decreased balance and decreased tolerance. Pt to benefit from continued skilled OT tx while in the hospital to address deficits as defined above and maximize level of functional independence w ADL's and functional mobility. Occupational Performance areas to address include: grooming, bathing/shower, toilet hygiene, dressing and functional mobility. From OT standpoint, recommendation at time of d/c would be no further OT needs.      OT Discharge Recommendation: No rehabilitation needs        SCAR Bush/L

## 2023-10-02 NOTE — ASSESSMENT & PLAN NOTE
Lab Results   Component Value Date    EGFR 53 10/01/2023    CREATININE 1.27 10/01/2023   Per chart review creatinine appears to be at baseline  Avoid nephrotoxic agents  Monitor creatinine with a.m.  BMP

## 2023-10-02 NOTE — ASSESSMENT & PLAN NOTE
- In regard to his prior stroke, he was evaluated at [facility] for dysphagia, slurred speech and intermittent vertigo. MRI brain 6/27/2019 revealed:   "Tiny acute small vessel infarctions in the right lateral lower brainstem medulla and adjacent right lower cerebellar vermis. 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 Mild focal plaquing and estimated 50% stenoses bilateral cavernous ICA. "  - Consider  mg OR while NPO.

## 2023-10-02 NOTE — ED PROVIDER NOTES
History  Chief Complaint   Patient presents with   • Slurred Speech     Patient was sitting at dinner with his family when he began having difficulty swallowing along with slurred speech and behind right eye numbness. Patient has a history of TIA in the past. Patient c/o dizziness for a short while but has resolved. Symptoms started around 8:15pm.     HPI   Patient is a 65-year-old male present emerged department with concerns for stroke. Patient had started sudden onset slurred speech, difficulty swallowing and numbness on the right side of his face near his eye. Symptoms started around 815 this evening. Patient presents with daughter at bedside. He is from out of [facility] and records are elsewhere. History of TIA several years ago. Patient recently stopped taking some of his medications. Known history of heart failure. Patient denies any recent fevers or chills nausea or vomiting. Denies any chest pain shortness of breath. Review of systems otherwise negative. Prior to Admission Medications   Prescriptions Last Dose Informant Patient Reported? Taking? amLODIPine (NORVASC) 5 mg tablet 2023  Yes Yes   Si mg   carvedilol (COREG) 3.125 mg tablet 2023  Yes Yes   Sig: 3.125 mg   furosemide (LASIX) 20 mg tablet Not Taking  Yes No   Si mg   Patient not taking: Reported on 10/1/2023   levothyroxine (Synthroid) 75 mcg tablet Not Taking  Yes No   Si mcg   Patient not taking: Reported on 10/1/2023   lisinopril-hydrochlorothiazide (PRINZIDE,ZESTORETIC) 20-25 MG per tablet 2023  Yes Yes   Sig: Take 1 tablet by mouth daily      Facility-Administered Medications: None       Past Medical History:   Diagnosis Date   • Hypertension    • TIA (transient ischemic attack)        History reviewed. No pertinent surgical history. History reviewed. No pertinent family history. I have reviewed and agree with the history as documented.     E-Cigarette/Vaping   • E-Cigarette Use Never User E-Cigarette/Vaping Substances     Social History     Tobacco Use   • Smoking status: Never   • Smokeless tobacco: Never   Vaping Use   • Vaping Use: Never used   Substance Use Topics   • Alcohol use: Never   • Drug use: Never       Review of Systems   Constitutional: Negative for chills and fever. HENT: Negative for ear pain and sore throat. Eyes: Negative for pain and visual disturbance. Respiratory: Negative for cough and shortness of breath. Cardiovascular: Negative for chest pain and palpitations. Gastrointestinal: Negative for abdominal pain and vomiting. Genitourinary: Negative for dysuria and hematuria. Musculoskeletal: Negative for arthralgias and back pain. Skin: Negative for color change and rash. Neurological: Positive for speech difficulty and numbness. Negative for seizures and syncope. All other systems reviewed and are negative. Physical Exam  Physical Exam  Vitals and nursing note reviewed. Constitutional:       General: He is not in acute distress. Appearance: He is well-developed. HENT:      Head: Normocephalic and atraumatic. Right Ear: Tympanic membrane and ear canal normal.      Left Ear: Tympanic membrane and ear canal normal.      Nose: Nose normal.      Mouth/Throat:      Mouth: Mucous membranes are moist.      Pharynx: Oropharynx is clear. Eyes:      Conjunctiva/sclera: Conjunctivae normal.   Cardiovascular:      Rate and Rhythm: Normal rate and regular rhythm. Heart sounds: No murmur heard. Pulmonary:      Effort: Pulmonary effort is normal. No respiratory distress. Breath sounds: Normal breath sounds. Abdominal:      Palpations: Abdomen is soft. Tenderness: There is no abdominal tenderness. Musculoskeletal:         General: No swelling. Cervical back: Neck supple. Skin:     General: Skin is warm and dry. Capillary Refill: Capillary refill takes less than 2 seconds.    Neurological:      Mental Status: He is alert and oriented to person, place, and time. Comments: Slurred speech.  Facial numbness right side of face   Psychiatric:         Mood and Affect: Mood normal.         Vital Signs  ED Triage Vitals   Temperature Pulse Respirations Blood Pressure SpO2   10/01/23 2033 10/01/23 2033 10/01/23 2033 10/01/23 2033 10/01/23 2033   97.7 °F (36.5 °C) 63 19 (!) 187/84 95 %      Temp Source Heart Rate Source Patient Position - Orthostatic VS BP Location FiO2 (%)   10/01/23 2033 10/01/23 2033 10/01/23 2033 10/01/23 2033 --   Oral Monitor Sitting Left arm       Pain Score       10/02/23 0822       No Pain           Vitals:    10/05/23 1500 10/05/23 1902 10/05/23 2252 10/05/23 2300   BP: 161/72 158/70 159/71 159/71   Pulse: 62 60 55 55   Patient Position - Orthostatic VS:             Visual Acuity  Visual Acuity    Flowsheet Row Most Recent Value   L Pupil Size (mm) 3   R Pupil Size (mm) 3   L Pupil Shape Round   R Pupil Shape Round          ED Medications  Medications   aspirin tablet 325 mg (325 mg Oral Not Given 10/1/23 2252)   clopidogrel (PLAVIX) tablet 300 mg (300 mg Oral Not Given 10/1/23 2253)   levothyroxine tablet 75 mcg (75 mcg Oral Not Given 10/5/23 0515)   atorvastatin (LIPITOR) tablet 40 mg (40 mg Oral Given 10/5/23 1755)   aspirin chewable tablet 81 mg (81 mg Oral Given 10/5/23 0958)   heparin (porcine) subcutaneous injection 5,000 Units (5,000 Units Subcutaneous Given 10/5/23 2117)   dextrose 5 % in lactated Ringer's infusion (75 mL/hr Intravenous New Bag 10/5/23 1427)   hydrALAZINE (APRESOLINE) injection 5 mg (5 mg Intravenous Given 10/4/23 0057)   amLODIPine (NORVASC) tablet 5 mg (5 mg Oral Given 10/5/23 0958)   carvedilol (COREG) tablet 3.125 mg (3.125 mg Oral Given 10/5/23 1755)   aluminum-magnesium hydroxide-simethicone (MAALOX) oral suspension 30 mL (30 mL Oral Not Given 10/4/23 6561)   pantoprazole (PROTONIX) injection 40 mg (40 mg Intravenous Given 10/5/23 8950)   bisacodyl (DULCOLAX) rectal suppository 10 mg (10 mg Rectal Given 10/5/23 1106)   pyridostigmine (MESTINON) tablet 30 mg (30 mg Oral Given 10/5/23 1755)   iohexol (OMNIPAQUE) 350 MG/ML injection (MULTI-DOSE) 85 mL (85 mL Intravenous Given 10/1/23 2104)   aspirin rectal suppository 300 mg (300 mg Rectal Given 10/2/23 0004)   pantoprazole (PROTONIX) injection 40 mg (40 mg Intravenous Given 10/2/23 1333)   hydrALAZINE (APRESOLINE) injection 5 mg (5 mg Intravenous Given 10/4/23 0329)   pantoprazole (PROTONIX) injection 40 mg (40 mg Intravenous Given 10/4/23 1834)   pyridostigmine (MESTINON) tablet 30 mg (30 mg Oral Given 10/5/23 1427)       Diagnostic Studies  Results Reviewed     Procedure Component Value Units Date/Time    Acetylcholine receptor, binding [[id number]] Collected: 10/02/23 1841    Lab Status: Final result Specimen: Blood from Arm, Right Updated: 10/05/23 1406     AChR Binding Ab, Serum 0.04 nmol/L     Narrative:      Performed at:  [address] [place] [address] "[employer], [place], [place]  [id number]  : David Duncan MD, Phone:  [phone]    CBC [[id number]]  (Abnormal) Collected: 10/03/23 0502    Lab Status: Final result Specimen: Blood from Hand, Left Updated: 10/03/23 0537     WBC 10.68 Thousand/uL      RBC 4.81 Million/uL      Hemoglobin 14.1 g/dL      Hematocrit 41.7 %      MCV 87 fL      MCH 29.3 pg      MCHC 33.8 g/dL      RDW 13.2 %      Platelets 669 Thousands/uL      MPV 10.3 fL     Basic metabolic panel [[id number]] Collected: 10/03/23 0502    Lab Status: Final result Specimen: Blood from Hand, Left Updated: 10/03/23 0534     Sodium 141 mmol/L      Potassium 3.7 mmol/L      Chloride 108 mmol/L      CO2 29 mmol/L      ANION GAP 4 mmol/L      BUN 23 mg/dL      Creatinine 1.04 mg/dL      Glucose 117 mg/dL      Calcium 9.0 mg/dL      eGFR 67 ml/min/1.73sq m     Narrative:      [place] guidelines for Chronic Kidney Disease (CKD):   •  Stage 1 with normal or high GFR (GFR > 90 mL/min/1.73 square meters)  •  Stage 2 Mild CKD (GFR = 60-89 mL/min/1.73 square meters)  •  Stage 3A Moderate CKD (GFR = 45-59 mL/min/1.73 square meters)  •  Stage 3B Moderate CKD (GFR = 30-44 mL/min/1.73 square meters)  •  Stage 4 Severe CKD (GFR = 15-29 mL/min/1.73 square meters)  •  Stage 5 End Stage CKD (GFR <15 mL/min/1.73 square meters)  Note: GFR calculation is accurate only with a steady state creatinine    Acetylcholine receptor, blocking [[id number]] Collected: 10/02/23 1841    Lab Status: In process Specimen: Blood from Arm, Right Updated: 10/02/23 1844    AChR-modulating Ab [[id number]] Collected: 10/02/23 1841    Lab Status: In process Specimen: Blood from Arm, Right Updated: 10/02/23 1844    MUSK Antibody [[id number]] Collected: 10/02/23 1841    Lab Status:  In process Specimen: Blood from Arm, Right Updated: 10/02/23 1844    HS Troponin 0hr (reflex protocol) [[id number]]  (Normal) Collected: 10/02/23 1221    Lab Status: Final result Specimen: Blood from Hand, Left Updated: 10/02/23 1255     hs TnI 0hr 35 ng/L     Hemoglobin A1c w/EAG Estimation [[id number]]  (Abnormal) Collected: 10/02/23 0430    Lab Status: Final result Specimen: Blood from Arm, Right Updated: 10/02/23 0901     Hemoglobin A1C 5.8 %       mg/dl     Lipid Panel with Direct LDL reflex [[id number]]  (Abnormal) Collected: 10/02/23 0430    Lab Status: Final result Specimen: Blood from Arm, Right Updated: 10/02/23 0513     Cholesterol 160 mg/dL      Triglycerides 81 mg/dL      HDL, Direct 41 mg/dL      LDL Calculated 103 mg/dL     Basic metabolic panel [[id number]]  (Abnormal) Collected: 10/02/23 0430    Lab Status: Final result Specimen: Blood from Arm, Right Updated: 10/02/23 0513     Sodium 139 mmol/L      Potassium 3.5 mmol/L      Chloride 107 mmol/L      CO2 26 mmol/L      ANION GAP 6 mmol/L      BUN 24 mg/dL      Creatinine 1.14 mg/dL      Glucose 113 mg/dL      Glucose, Fasting 113 mg/dL      Calcium 9.1 mg/dL      eGFR 60 ml/min/1.73sq m     Narrative: National Kidney Disease Foundation guidelines for Chronic Kidney Disease (CKD):   •  Stage 1 with normal or high GFR (GFR > 90 mL/min/1.73 square meters)  •  Stage 2 Mild CKD (GFR = 60-89 mL/min/1.73 square meters)  •  Stage 3A Moderate CKD (GFR = 45-59 mL/min/1.73 square meters)  •  Stage 3B Moderate CKD (GFR = 30-44 mL/min/1.73 square meters)  •  Stage 4 Severe CKD (GFR = 15-29 mL/min/1.73 square meters)  •  Stage 5 End Stage CKD (GFR <15 mL/min/1.73 square meters)  Note: GFR calculation is accurate only with a steady state creatinine    CBC and differential [[id number]]  (Abnormal) Collected: 10/02/23 0430    Lab Status: Final result Specimen: Blood from Arm, Right Updated: 10/02/23 0449     WBC 6.73 Thousand/uL      RBC 5.09 Million/uL      Hemoglobin 14.7 g/dL      Hematocrit 43.5 %      MCV 86 fL      MCH 28.9 pg      MCHC 33.8 g/dL      RDW 13.1 %      MPV 10.3 fL      Platelets 517 Thousands/uL      nRBC 0 /100 WBCs      Neutrophils Relative 63 %      Immat GRANS % 0 %      Lymphocytes Relative 21 %      Monocytes Relative 13 %      Eosinophils Relative 2 %      Basophils Relative 1 %      Neutrophils Absolute 4.22 Thousands/µL      Immature Grans Absolute 0.03 Thousand/uL      Lymphocytes Absolute 1.44 Thousands/µL      Monocytes Absolute 0.84 Thousand/µL      Eosinophils Absolute 0.16 Thousand/µL      Basophils Absolute 0.04 Thousands/µL     HS Troponin I 4hr [[id number]]  (Normal) Collected: 10/02/23 0118    Lab Status: Final result Specimen: Blood from Arm, Right Updated: 10/02/23 0202     hs TnI 4hr 40 ng/L      Delta 4hr hsTnI 1 ng/L     HS Troponin I 2hr [[id number]]  (Normal) Collected: 10/02/23 0000    Lab Status: Final result Specimen: Blood from Arm, Right Updated: 10/02/23 0048     hs TnI 2hr 39 ng/L      Delta 2hr hsTnI 0 ng/L     Platelet count [[id number]]  (Normal) Collected: 10/02/23 0000    Lab Status: Final result Specimen: Blood from Arm, Right Updated: 10/02/23 0008     Platelets 830 Thousands/uL      MPV 9.8 fL     TSH, 3rd generation [[id number]]  (Abnormal) Collected: 10/01/23 2100    Lab Status: Final result Specimen: Blood from Arm, Right Updated: 10/01/23 2323     TSH 3RD GENERATON 5.252 uIU/mL     FLU/RSV/COVID - if FLU/RSV clinically relevant [[id number]]  (Normal) Collected: 10/01/23 2111    Lab Status: Final result Specimen: Nares from Nose Updated: 10/01/23 2156     SARS-CoV-2 Negative     INFLUENZA A PCR Negative     INFLUENZA B PCR Negative     RSV PCR Negative    Narrative:      FOR PEDIATRIC PATIENTS - copy/paste COVID Guidelines URL to browser: [email or web address]    SARS-CoV-2 assay is a Nucleic Acid Amplification assay intended for the  qualitative detection of nucleic acid from SARS-CoV-2 in nasopharyngeal  swabs. Results are for the presumptive identification of SARS-CoV-2 RNA. Positive results are indicative of infection with SARS-CoV-2, the virus  causing COVID-19, but do not rule out bacterial infection or co-infection  with other viruses. Laboratories within the [facility] and its  territories are required to report all positive results to the appropriate  public health authorities. Negative results do not preclude SARS-CoV-2  infection and should not be used as the sole basis for treatment or other  patient management decisions. Negative results must be combined with  clinical observations, patient history, and epidemiological information. This test has not been FDA cleared or approved. This test has been authorized by FDA under an Emergency Use Authorization  (EUA). This test is only authorized for the duration of time the  declaration that circumstances exist justifying the authorization of the  emergency use of an in vitro diagnostic tests for detection of SARS-CoV-2  virus and/or diagnosis of COVID-19 infection under section 564(b)(1) of  the Act, 21 U. S.C. 105BWU-2(X)(8), unless the authorization is terminated  or revoked sooner. The test has been validated but independent review by FDA  and CLIA is pending. Test performed using [employer] GeneXpert: This RT-PCR assay targets N2,  a region unique to SARS-CoV-2. A conserved region in the E-gene was chosen  for pan-Sarbecovirus detection which includes SARS-CoV-2. According to CMS-2020-01-R, this platform meets the definition of high-throughput technology.     HS Troponin 0hr (reflex protocol) [[id number]]  (Normal) Collected: 10/01/23 2100    Lab Status: Final result Specimen: Blood from Arm, Right Updated: 10/01/23 2129     hs TnI 0hr 39 ng/L     Basic metabolic panel [[id number]]  (Abnormal) Collected: 10/01/23 2100    Lab Status: Final result Specimen: Blood from Arm, Right Updated: 10/01/23 2120     Sodium 139 mmol/L      Potassium 3.7 mmol/L      Chloride 105 mmol/L      CO2 27 mmol/L      ANION GAP 7 mmol/L      BUN 31 mg/dL      Creatinine 1.27 mg/dL      Glucose 142 mg/dL      Calcium 9.1 mg/dL      eGFR 53 ml/min/1.73sq m     Narrative:      [place] guidelines for Chronic Kidney Disease (CKD):   •  Stage 1 with normal or high GFR (GFR > 90 mL/min/1.73 square meters)  •  Stage 2 Mild CKD (GFR = 60-89 mL/min/1.73 square meters)  •  Stage 3A Moderate CKD (GFR = 45-59 mL/min/1.73 square meters)  •  Stage 3B Moderate CKD (GFR = 30-44 mL/min/1.73 square meters)  •  Stage 4 Severe CKD (GFR = 15-29 mL/min/1.73 square meters)  •  Stage 5 End Stage CKD (GFR <15 mL/min/1.73 square meters)  Note: GFR calculation is accurate only with a steady state creatinine    Protime-INR [[id number]]  (Normal) Collected: 10/01/23 2100    Lab Status: Final result Specimen: Blood from Arm, Right Updated: 10/01/23 2116     Protime 13.6 seconds      INR 0.98    APTT [[id number]]  (Normal) Collected: 10/01/23 2100    Lab Status: Final result Specimen: Blood from Arm, Right Updated: 10/01/23 2116     PTT 32 seconds     CBC and Platelet [[id number]]  (Normal) Collected: 10/01/23 2100    Lab Status: Final result Specimen: Blood from Arm, Right Updated: 10/01/23 2104     WBC 6.75 Thousand/uL      RBC 5.07 Million/uL      Hemoglobin 14.9 g/dL      Hematocrit 43.9 %      MCV 87 fL      MCH 29.4 pg      MCHC 33.9 g/dL      RDW 13.2 %      Platelets 923 Thousands/uL      MPV 10.1 fL     Fingerstick Glucose (POCT) [[id number]]  (Abnormal) Collected: 10/01/23 2043    Lab Status: Final result Updated: 10/01/23 2043     POC Glucose 144 mg/dl                  MRI brain wo contrast   Final Result by Mary Trevino MD (10/02 1505)   1. No acute infarction, edema, or mass effect. 2. Mild chronic microangiopathic ischemic changes. Workstation performed: [id number]         CT head wo contrast   Final Result by Mary Ruiz MD (10/02 1939)      No acute intracranial abnormality. Workstation performed: [id number]         CTA stroke alert (head/neck)   Final Result by Tiki Spencer MD (10/01 2141)      1. 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 Mild stenosis at both terminal ICAs      I personally discussed impression 1 with Elis Paul 10/1/23 9:20 PM            Workstation performed: [id number]         CT stroke alert brain   Final Result by Tiki Spencer MD (10/01 2141)      1. 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 Mild stenosis at both terminal ICAs      I personally discussed impression 1 with Elis Paul 10/1/23 9:20 PM            Workstation performed: [id number]                    Procedures  Procedures         ED Course       EKG Rate 59, LBBB.  No priors           Stroke Assessment     Row Name 10/01/23 2045             NIH Stroke Scale    Interval --      Level of Consciousness (1a.) 0      LOC Questions (1b.) 0      LOC Commands (1c.) 0      Best Gaze (2.) 0 Visual (3.) 0      Facial Palsy (4.) 0      Motor Arm, Left (5a.) 0      Motor Arm, Right (5b.) 0      Motor Leg, Left (6a.) 0      Motor Leg, Right (6b.) 0      Limb Ataxia (7.) 0      Sensory (8.) 0      Best Language (9.) 1      Dysarthria (10.) 0      Extinction and Inattention (11.) (Formerly Neglect) 0      Total 1                            SBIRT 22yo+    Flowsheet Row Most Recent Value   Initial Alcohol Screen: US AUDIT-C     1. How often do you have a drink containing alcohol? 0 Filed at: 10/01/2023 2035   2. How many drinks containing alcohol do you have on a typical day you are drinking? 0 Filed at: 10/01/2023 2035   3a. Male UNDER 65: How often do you have five or more drinks on one occasion? 0 Filed at: 10/01/2023 2035   3b. FEMALE Any Age, or MALE 65+: How often do you have 4 or more drinks on one occassion? 0 Filed at: 10/01/2023 2035   Audit-C Score 0 Filed at: 10/01/2023 2035   SARAH: How many times in the past year have you. .. Used an illegal drug or used a prescription medication for non-medical reasons? Never Filed at: 10/01/2023 2035                    Medical Decision Making  Patient is a well-appearing 58-year-old male presenting in no acute respiratory distress and vital signs overall unremarkable. On initial examination patient had a stroke scale of 1 for slurred speech. No noted numbness or tingling on examination. Stroke alert was called given the onset of symptoms and the noted slurred speech. BMP and CBC overall unremarkable. Flu COVID RSV negative. Coags within normal limits. Initial troponin 39.    Spoke to neurology who reviewed the patient's findings and results. Patient found to have occlusion of V3 V4 segments of the right vertebral artery and stenosis at terminal ICAs. Uncertain chronicity. Neurology recommended loading dose of aspirin and Plavix. Patient's symptoms remained stable over time without any increased symptoms.   Spoke to [facility] about test results and findings and need for further work-up and stroke pathway. Patient and family are agreeable to disposition for further eval.      Amount and/or Complexity of Data Reviewed  Labs: ordered. Radiology: ordered. Risk  Prescription drug management. Decision regarding hospitalization. Disposition  Final diagnoses:   Slurred speech   Vertebral artery occlusion     Time reflects when diagnosis was documented in both MDM as applicable and the Disposition within this note     Time User Action Codes Description Comment    10/1/2023  8:57 PM Michelle FOWLER Add [R47.81] Slurred speech     10/1/2023 10:19 PM Michelle FOWLER Add [I65.09] Vertebral artery occlusion     10/1/2023 10:53 PM Rocio Puente Add [Z86.73] History of CVA (cerebrovascular accident)       ED Disposition     ED Disposition   Admit    Condition   Stable    Date/Time   Sun Oct 1, 2023 10:19 PM    Comment   Case was discussed with hospitalist and the patient's admission status was agreed to be Admission Status: observation status to the service of Dr. Silvano Pink . Follow-up Information    None         Current Discharge Medication List      CONTINUE these medications which have NOT CHANGED    Details   amLODIPine (NORVASC) 5 mg tablet 5 mg      carvedilol (COREG) 3.125 mg tablet 3.125 mg      lisinopril-hydrochlorothiazide (PRINZIDE,ZESTORETIC) 20-25 MG per tablet Take 1 tablet by mouth daily      furosemide (LASIX) 20 mg tablet 20 mg      levothyroxine (Synthroid) 75 mcg tablet 75 mcg             No discharge procedures on file.     PDMP Review     None          ED Provider  Electronically Signed by           Katina Barrios DO  10/06/23 0106

## 2023-10-02 NOTE — ASSESSMENT & PLAN NOTE
Stroke alert presenting with slurred speech, dysphagia and numbness near right eye. Symptoms improving. Has mildly slurred speech. Hx of TIA. · Stroke alert, neurology following. Appreciate recommendations. Recommended aspirin and Plavix load continuing with aspirin 81 mg daily followed by Plavix 75 mg daily  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 as described, this could represent acute or chronic occlusion  · MRI brain, echo, lipid panel, hemoglobin A1c pending  · Allow for permissive hypertension to 210/110 for 24 hours  · Interval update: Notified by nursing that patient initially passed dysphagia screening around 8 PM but now acutely worsened. No other focal neurological deficits or changes.   Will order repeat stat head CT  · Monitor on telemetry  · PT/OT/speech

## 2023-10-02 NOTE — ASSESSMENT & PLAN NOTE
Pressure on arrival 180s, repeat 160s without intervention.   Hold home dose Coreg, amlodipine, lisinopril and HCTZ  We will allow for permissive hypertension as above  Monitor vitals closely

## 2023-10-02 NOTE — ASSESSMENT & PLAN NOTE
Wt Readings from Last 3 Encounters:   10/01/23 98.5 kg (217 lb 2.5 oz)       Per chart review has a history of heart failure with reduced EF on Lasix 20 mg daily  And appears euvolemic on exam  Currently not on Lasix, only on HCTZ  Intake and output  Repeat echocardiogram pending for stroke  Monitor creatinine with a.m.  BMP

## 2023-10-02 NOTE — PROGRESS NOTES
[address]  Progress Note  Name: Marva Huffman  MRN: [id number]  Unit/Bed#: ED 08 I Date of Admission: 10/1/2023   Date of Service: 10/2/2023 I Hospital Day: 0    Assessment/Plan   HFrEF (heart failure with reduced ejection fraction) ([address])  Assessment & Plan  Wt Readings from Last 3 Encounters:   10/02/23 98.4 kg (217 lb)       Per chart review has a history of heart failure with reduced EF on Lasix 20 mg daily  And appears euvolemic on exam  Currently not on Lasix, only on HCTZ  Intake and output  Monitor creatinine with a.m. BMP  We will need to continue to monitor respiratory status as we will start giving gentle IVF in the setting of n.p.o. status    Hypothyroidism  Assessment & Plan  Repeat TSH pending  Pt reports not currently on levothyroxine    History of CVA (cerebrovascular accident)  Assessment & Plan  Patient has a history of CVA, does not appear to be on aspirin or Plavix. Stroke pathway as above    Essential hypertension  Assessment & Plan  Pressure on arrival 180s, repeat 160s without intervention. Hold home dose Coreg, amlodipine, lisinopril and HCTZ  We will allow for permissive hypertension as above  Monitor vitals closely    CKD (chronic kidney disease)  Assessment & Plan  Lab Results   Component Value Date    EGFR 53 10/01/2023    CREATININE 1.27 10/01/2023   Per chart review creatinine appears to be at baseline  Avoid nephrotoxic agents  Monitor creatinine with a.m. BMP    * Stroke-like symptoms  Assessment & Plan  Stroke alert presenting with slurred speech, dysphagia and numbness near right eye. Symptoms improving. Has mildly slurred speech. Hx of TIA. · Stroke alert, neurology following. Appreciate recommendations. Recommended aspirin and Plavix load continuing with aspirin 81 mg daily followed by Plavix 75 mg daily  · Reports hx of stroke 4 years ago, not on asa or plavix currently.    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 given dysphagia  · Neuro consulted, will likely start myasthenia's gravis work-up tomorrow given patient has no stroke               VTE Pharmacologic Prophylaxis: VTE Score: 8 High Risk (Score >/= 5) - Pharmacological DVT Prophylaxis Ordered: heparin. Sequential Compression Devices Ordered. Patient Centered Rounds: I performed bedside rounds with nursing staff today. Discussions with Specialists or Other Care Team Provider: CM, neuro    Education and Discussions with Family / Patient: Updated  (daughter) at bedside. Total Time Spent on Date of Encounter in care of patient: 35 mins. This time was spent on one or more of the following: performing physical exam; counseling and coordination of care; obtaining or reviewing history; documenting in the medical record; reviewing/ordering tests, medications or procedures; communicating with other healthcare professionals and discussing with patient's family/caregivers. Current Length of Stay: 0 day(s)  Current Patient Status: Inpatient   Certification Statement: The patient will continue to require additional inpatient hospital stay due to Continue work-up for myasthenia gravis  Discharge Plan: Anticipate discharge in 48 hrs to To be determined    Code Status: Level 1 - Full Code    Subjective:   Patient reports having continued garbled speech and difficulty swallowing. He has developing a cough. He denies lightheadedness, palpitations, chills, nausea, or shortness of breath.     Objective:     Vitals:   Temp (24hrs), Av.7 °F (36.5 °C), Min:97.7 °F (36.5 °C), Max:97.7 °F (36.5 °C)    Temp:  [97.7 °F (36.5 °C)] 97.7 °F (36.5 °C)  HR:  [48-63] 59  Resp:  [13-28] 24  BP: (142-207)/() 180/77  SpO2: [90 %-99 %] 91 %  Body mass index is 32.05 kg/m². Input and Output Summary (last 24 hours): Intake/Output Summary (Last 24 hours) at 10/2/2023 1707  Last data filed at 10/2/2023 0445  Gross per 24 hour   Intake --   Output 300 ml   Net -300 ml       Physical Exam:   Physical Exam  Vitals and nursing note reviewed. Constitutional:       Appearance: He is normal weight. HENT:      Head: Normocephalic. Nose: Nose normal.      Mouth/Throat:      Mouth: Mucous membranes are moist.      Pharynx: Oropharynx is clear. Eyes:      General: No scleral icterus. Conjunctiva/sclera: Conjunctivae normal.      Pupils: Pupils are equal, round, and reactive to light. Cardiovascular:      Rate and Rhythm: Normal rate and regular rhythm. Heart sounds: No murmur heard. No friction rub. No gallop. Pulmonary:      Effort: Pulmonary effort is normal. No respiratory distress. Breath sounds: Normal breath sounds. No stridor. No wheezing, rhonchi or rales. Abdominal:      General: Abdomen is flat. Palpations: Abdomen is soft. Musculoskeletal:         General: Normal range of motion. Cervical back: Normal range of motion and neck supple. Right lower leg: No edema. Left lower leg: No edema. Lymphadenopathy:      Cervical: No cervical adenopathy. Skin:     General: Skin is warm. Coloration: Skin is not jaundiced or pale. Findings: No bruising, erythema or lesion. Neurological:      Mental Status: He is alert and oriented to person, place, and time. Mental status is at baseline. Cranial Nerves: No cranial nerve deficit. Motor: No weakness. Comments: Slurred garbled speech   Psychiatric:         Mood and Affect: Mood normal.         Behavior: Behavior normal.         Thought Content:  Thought content normal.          Additional Data:     Labs:  Results from last 7 days   Lab Units 10/02/23  0430   WBC Thousand/uL 6.73   HEMOGLOBIN g/dL 14.7   HEMATOCRIT % 43.5   PLATELETS Thousands/uL 216   NEUTROS PCT % 63   LYMPHS PCT % 21   MONOS PCT % 13*   EOS PCT % 2     Results from last 7 days   Lab Units 10/02/23  0430   SODIUM mmol/L 139   POTASSIUM mmol/L 3.5   CHLORIDE mmol/L 107   CO2 mmol/L 26   BUN mg/dL 24   CREATININE mg/dL 1.14   ANION GAP mmol/L 6   CALCIUM mg/dL 9.1   GLUCOSE RANDOM mg/dL 113     Results from last 7 days   Lab Units 10/01/23  2100   INR  0.98     Results from last 7 days   Lab Units 10/01/23  2043   POC GLUCOSE mg/dl 144*     Results from last 7 days   Lab Units 10/02/23  0430   HEMOGLOBIN A1C % 5.8*           Lines/Drains:  Invasive Devices     Peripheral Intravenous Line  Duration           Peripheral IV 10/01/23 Left Antecubital <1 day    Peripheral IV 10/01/23 Right;Ventral (anterior) Forearm <1 day                      Imaging: Reviewed radiology reports from this admission including: MRI brain    Recent Cultures (last 7 days):         Last 24 Hours Medication List:   Current Facility-Administered Medications   Medication Dose Route Frequency Provider Last Rate   • aspirin  81 mg Oral Daily [facility] DO     • aspirin  325 mg Oral Once [place], DO     • atorvastatin  40 mg Oral QPM [facility] S[facility], DO     • clopidogrel  300 mg Oral Once Yulia [facility] Saraiya, DO     • clopidogrel  75 mg Oral Daily [facility] DO     • dextrose 5% lactated ringer's  75 mL/hr Intravenous Continuous Natalie Marcano PA-C     • heparin (porcine)  5,000 Units Subcutaneous Q8H [facility], DO     • levothyroxine  75 mcg Oral Early Morning [facility] DO     • pyridostigmine  30 mg Oral TID LUDWIN Lenz          Today, Patient Was Seen By: Natalie Marcano PA-C    **Please Note: This note may have been constructed using a voice recognition system. **

## 2023-10-02 NOTE — SPEECH THERAPY NOTE
Speech-Language Pathology Bedside Swallow Evaluation        Patient Name: Breanna CAMPOS Date: 10/2/2023     Problem List  Principal Problem:    Stroke-like symptoms  Active Problems:    CKD (chronic kidney disease)    Essential hypertension    History of CVA (cerebrovascular accident)    Hypothyroidism    Hyperlipidemia    HFrEF (heart failure with reduced ejection fraction) ([address] [facility])       Past Medical History  Past Medical History:   Diagnosis Date   • Hypertension    • TIA (transient ischemic attack)        Past Surgical History  History reviewed. No pertinent surgical history. Summary/Impressions:    Pt presents with s/s oropharyngeal dysphagia characterized by poor management of secretions and (reported) backflow/reflux. Pt frequently expectorates in emesis bag. Agreeable to trace quantities of puree solid. Laryngeal rise palpated, swallow initiation appears delayed/?incomplete. Immediate wet vocal quality, cough and expectoration evident. Cannot r/o aspiration of same, or retrograde flow. Hypernasal vocal resonance noted, ?able reduced velar mvmt upon visual inspection. Difficulty with production of voiced and voiceless bilabial (non nasal) sounds (ie /p/, /b/). Patient w/ significant dysphagia history s/p CVA in 2019- see below for imaging. Per pt report, swallow function has improved overall and baseline diet was since advanced to regular solids/thin liq. Current symptoms are relatively new, pt reports gradual onset. Neurology on board.        Recommendations:   Diet: NPO   Meds: non-oral if possible   Feeding assistance: NA  Frequent Oral care: 2-4x/day  Aspiration precautions and compensatory swallowing strategies: upright posture**, suction set up, frequent oral care, reflux precautions   Other Recommendations/ considerations: SLP will continue to follow to for re-evaluation/determien safety of oral intake and/or need for further instrumental  assessment (MBS) x 3-5    Current Medical Status  Pt is a 77 y.o. male who presented to [address] [facility] with hx of dysphagia, CVA and new onset stroke-like symptoms/  Per chart worsening dysphagia overnight, initially passed RN dysphagia screen. Additional information re: CVA and swallow function obtained via Care Everywhere. Patient followed by speech while in the acute care setting with outpatient services following discharge. See below for most recent instrumental measures:     Past medical history:  Please see H&P for details    Special Studies:  7/30/19 Fiberoptic Endoscopic Evaluation of Swallowing (FEES):   Mild oropharyngeal dysphagia characterized by mild impairment of swallow efficiency and no impairment of swallow safety. Improved swallow function from previous findings of moderate-severe dysphagia. 6/29/19 Modified Barium Swallow Study (MBS):  Prompt swallow initiation for all consistencies trialed today (puree, soft solid, and thin liquid). Pharyngeal residue is noted in the vallecula, pyriform sinus and at the entrance of the UES for all consistencies trialed. Clearing swallows are effective at minimizing residual material. Effortful swallow appears somewhat effective characterized by minimal reduction of pharyngeal residue. Chin tuck is not effective, and appears to result in greater residue.    Recommendations Diet Consistency Recommendations;Swallowing Strategies   Diet Consistency Recommendations Dysphagia-pureed     Social/Education/Vocational Hx:  Pt lives alone    Swallow Information   Current Risks for Dysphagia & Aspiration: CVA and known history of dysphagia  Current Symptoms/Concerns: coughing with po  Current Diet: NPO   Baseline Diet: regular diet and thin liquids    Baseline Assessment   Behavior/Cognition: alert  Speech/Language Status: able to participate in conversation  Patient Positioning: upright in bed    Swallow Mechanism Exam   Facial: symmetrical  Labial: WFL  Lingual: WFL (very subtle deviation)   Velum: ?decreased ROM  Mandible: adequate ROM  Dentition: adequate  Vocal quality:hypernasal vocal resonance   Volitional Cough: weak   Respiratory: RA    Consistencies Assessed and Performance   Consistencies Administered: ice chips and puree    Oral Stage: Adequate bolus retrieval and containment. Transfer prompt w/ no retention. Mastication not assessed for pt safety. Pharyngeal Stage: Laryngeal rise noted upon palpation. Swallow initiation appears delayed, ?incomplete. Multiple swallows noted w/ small quantity of puree, and what pt reports to be reflux. Poor secretion mgmt overall w/ frequent coughing and expectoration. Pharyngeal dysphagia suspected, cannot r/o chronic aspiration. Esophageal Concerns: pt reports reflux symptoms; esophgeal spasm noted in care everywhere      Results Reviewed with: patient, RN, Penny Toussaint and PA     Plan  Will continue to follow for x3-5  Dysphagia Goals: pt will participate in repeat swallow eval to determine safety of po intake and/or further instrumental testing. Pt will participate in trials for advancement across x1-3 diagnostic sessions.     Discharge recommendation: outpt    Speech Therapy Prognosis   Prognosis: fair  Prognosis Considerations: medical status        Frank Rasmussen, [address] [facility], [address] [facility]  Speech-Language Pathologist  [place] #[id number]  [place] #[id number]

## 2023-10-02 NOTE — ASSESSMENT & PLAN NOTE
Wt Readings from Last 3 Encounters:   10/02/23 98.4 kg (217 lb)       Per chart review has a history of heart failure with reduced EF on Lasix 20 mg daily  And appears euvolemic on exam  Currently not on Lasix, only on HCTZ  Intake and output  Monitor creatinine with a.m. BMP  We will need to continue to monitor respiratory status as we will start giving gentle IVF in the setting of n.p.o. status

## 2023-10-02 NOTE — CONSULTS
Consultation - Neurology   Fawad Holguin 77 y.o. male MRN: [id number]  Unit/Bed#: ED 08 Encounter: [id number]      Assessment/Plan     * Stroke-like symptoms  Assessment & Plan  Fawad Holguin is a 77 y.o.  male with prior stroke 2019, HTN, HLD, HFrEF, hypothyroid, and CKD who presented to [facility] 10/1/23 with sudden onset slurred speech, difficulty swallowing and a small area of numbness around his right eye. In the ED, patient and family reported that the numbness resolved and dysarthria/dysphagia were improving. Stroke alert initiated. NIHSS 1 for mild dysarthria. /84, . CTH/CTA notable for right V3 and V4 occlusion (also noted on CTA report 2019). 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 He was not a candidate for TNK as his symptoms were improving with low NIH. He was loaded with asa 325mg and Plavix 300mg x1 and admitted on the stroke pathway. Later that evening, he had significant worsening of his dysphagia. He was made NPO, hence only received ASA MN. Repeat CTH stable. Work-up:  Imaging:  10/1/23 CTH/CTA:   1.  Occlusion of the V3 and V4 segments of the right vertebral artery as described, this could represent acute or chronic occlusion  2. Mild stenosis at both terminal ICAs  10/1/23 Repeat CTH; No acute intracranial abnormality.     Pertinent labs:  Lipid panel:, TG 81, HDL 41,   TSH 5.2  A1c 5.8  Recommendations:  Stroke vs possible myasthenia  - Stroke pathway  • MRI brain pending  • Echo pending  • Hemoglobin A1c pending  • TSH elevated; check T4  • Continue DAPT with asa 81mg and plavix 75mg daily   • Atorvastatin 40 mg daily  • Permissive HTN for 24hrs, avoid hypotension  • Euglycemic, normothermic goal  • Continue telemetry  • PT/OT/ST  • Stroke education  • Continue to monitor and notify neurology with any changes.   - Medical management andcorrection of any metabolic or infectious disturbances per primary service. - If MRI negative for acute stroke, will pursue MG workup and trial mestinon        History of CVA (cerebrovascular accident)  Assessment & Plan  In regard to his prior stroke, he was evaluated at [facility] for dysphagia, slurred speech and intermittent vertigo. MRI brain 6/27/2019 revealed " Tiny acute small vessel infarctions in the right lateral lower brainstem medulla and adjacent right lower cerebellar vermis. No findings to explain dysphagia or slurred speech." CTA H/N showed, "Short segment occlusion distal right vertebral artery as above, acute versus chronic. Mild focal plaquing and estimated 50% stenoses bilateral cavernous ICA. "    Continue DAPT and statin as above    Essential hypertension  Assessment & Plan  Permissive htn per stroke pathway; avoid hypotension    Hyperlipidemia  Assessment & Plan  Continue atorvastatin 40mg daily  LDL goal <70    Hypothyroidism  Assessment & Plan  TSH 5.2; recommend checking T4    Recommendations for outpatient neurological follow up have yet to be determined. Reason for Consult / Principal Problem: "stroke alert"  HPI: Alison Aguilar is a 77 y.o.  male with prior stroke 2019, HTN, HLD, HFrEF, hypothyroid, and CKD who presented to [facility] 10/1/23 with sudden onset slurred speech, difficulty swallowing and a small area of numbness around his right eye. In the ED, patient and family reported that the numbness resolved and dysarthria/dysphagia were improving. Stroke alert initiated. NIHSS 1 for mild dysarthria. /84, . CTH/CTA notable for right V3 and V4 occlusion (also noted on CTA report 2019). 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 He was not a candidate for TNK as his symptoms were improving with low NIH.  He was loaded with asa 325mg and Plavix 300mg x1 and admitted on the stroke pathway. Later that evening, he had significant worsening of his dysphagia. He was made NPO, hence only received ASA LA. Repeat CTH stable. At present, he is slightly dysarthric with dysphagia and difficulty clearing his secretions. Reports "reflux". He reports that his presenting symptoms were sudden onset, similar to his prior stroke. No headache or dizziness. He walked with PT this morning without difficulty. No vision changes or double vision. Some eyelid fatigue noted with repeated eom testing. He feels that his eyes get tired with prolonged screen time. He was previously on asa/plavix following his stroke. Plavix was stopped after 1 year and he stopped taking asa on his own    In regard to his prior stroke, [facility], slurred speech and intermittent vertigo. MRI brain 6/27/2019 revealed " Tiny acute small vessel infarctions in the right lateral lower brainstem medulla and adjacent right lower cerebellar vermis. No findings to explain dysphagia or slurred speech." CTA H/N showed, "Short segment occlusion distal right vertebral artery as above, acute versus chronic. Mild focal plaquing and estimated 50% stenoses bilateral cavernous ICA. "    Inpatient consult to Neurology  Consult performed by: LUDWIN Sears  Consult ordered by: Ulysses Crosby, DO        Review of Systems   HENT: Positive for trouble swallowing. Eyes: Negative for visual disturbance. Neurological: Positive for speech difficulty. Negative for dizziness, weakness, numbness and headaches. Historical Information   Past Medical History:   Diagnosis Date   • Hypertension    • TIA (transient ischemic attack)      History reviewed. No pertinent surgical history.   Social History   Social History     Substance and Sexual Activity   Alcohol Use Never     Social History     Substance and Sexual Activity   Drug Use Never     E-Cigarette/Vaping   • E-Cigarette Use Never User      E-Cigarette/Vaping Substances     Social History     Tobacco Use   Smoking Status Never   Smokeless Tobacco Never     Family History: History reviewed. No pertinent family history. Review of previous medical records was  completed. Meds/Allergies   current meds:   Current Facility-Administered Medications   Medication Dose Route Frequency   • aspirin chewable tablet 81 mg  81 mg Oral Daily   • aspirin tablet 325 mg  325 mg Oral Once   • atorvastatin (LIPITOR) tablet 40 mg  40 mg Oral QPM   • clopidogrel (PLAVIX) tablet 300 mg  300 mg Oral Once   • clopidogrel (PLAVIX) tablet 75 mg  75 mg Oral Daily   • heparin (porcine) subcutaneous injection 5,000 Units  5,000 Units Subcutaneous Q8H [facility]   • levothyroxine tablet 75 mcg  75 mcg Oral Early Morning    and PTA meds:   Prior to Admission Medications   Prescriptions Last Dose Informant Patient Reported? Taking? amLODIPine (NORVASC) 5 mg tablet 2023  Yes Yes   Si mg   carvedilol (COREG) 3.125 mg tablet 2023  Yes Yes   Sig: 3.125 mg   furosemide (LASIX) 20 mg tablet Not Taking  Yes No   Si mg   Patient not taking: Reported on 10/1/2023   levothyroxine (Synthroid) 75 mcg tablet Not Taking  Yes No   Si mcg   Patient not taking: Reported on 10/1/2023   lisinopril-hydrochlorothiazide (PRINZIDE,ZESTORETIC) 20-25 MG per tablet 2023  Yes Yes   Sig: Take 1 tablet by mouth daily      Facility-Administered Medications: None       No Known Allergies    Objective   Vitals:Blood pressure (!) 174/74, pulse (!) 54, temperature 97.7 °F (36.5 °C), temperature source Oral, resp. rate (!) 24, weight 98.5 kg (217 lb 2.5 oz), SpO2 91 %. ,There is no height or weight on file to calculate BMI. Intake/Output Summary (Last 24 hours) at 10/2/2023 1130  Last data filed at 10/2/2023 0445  Gross per 24 hour   Intake --   Output 300 ml   Net -300 ml       Invasive Devices:    Invasive Devices     Peripheral Intravenous Line  Duration Peripheral IV 10/01/23 Left Antecubital <1 day    Peripheral IV 10/01/23 Right;Ventral (anterior) Forearm <1 day                Physical Exam  Vitals reviewed. Constitutional:       General: He is not in acute distress. HENT:      Head: Normocephalic and atraumatic. Eyes:      Extraocular Movements: EOM normal.      Pupils: Pupils are equal, round, and reactive to light. Pulmonary:      Comments: Difficulty managing secretions  Musculoskeletal:      Right lower leg: Edema present. Left lower leg: Edema present. Skin:     General: Skin is warm and dry. Neurological:      Mental Status: He is alert and oriented to person, place, and time. Motor: Motor strength is normal.     Coordination: Finger-Nose-Finger Test normal.      Deep Tendon Reflexes:      Reflex Scores:       Tricep reflexes are 2+ on the right side and 2+ on the left side. Bicep reflexes are 2+ on the right side and 2+ on the left side. Brachioradialis reflexes are 2+ on the right side and 2+ on the left side. Patellar reflexes are 2+ on the right side and 2+ on the left side. Psychiatric:         Speech: Speech is slurred. Neurologic Exam     Mental Status   Oriented to person, place, and time. Follows 3 step commands. Speech: slurred   Level of consciousness: alert  Able to repeat. Mild dysarthria     Cranial Nerves     CN II   Right visual field deficit: none  Left visual field deficit: none     CN III, IV, VI   Pupils are equal, round, and reactive to light. Extraocular motions are normal.     CN V   Right facial sensation deficit: none  Left facial sensation deficit: none    CN VII   Right facial weakness: none  Left facial weakness: none  Uvula slightly deviated to right, palate moving but decreased on the right  Eyelid fatigue with repeated testing     Motor Exam   Right arm pronator drift: absent  Left arm pronator drift: absent    Strength   Strength 5/5 throughout.      Sensory Exam   Pinprick normal.     Gait, Coordination, and Reflexes     Coordination   Finger to nose coordination: normal    Reflexes   Right brachioradialis: 2+  Left brachioradialis: 2+  Right biceps: 2+  Left biceps: 2+  Right triceps: 2+  Left triceps: 2+  Right patellar: 2+  Left patellar: 2+  Right plantar: normal  Left plantar: normalNormal gait per PT       Lab Results:   CBC:   Results from last 7 days   Lab Units 10/02/23  0430 10/02/23  0000 10/01/23  2100   WBC Thousand/uL 6.73  --  6.75   RBC Million/uL 5.09  --  5.07   HEMOGLOBIN g/dL 14.7  --  14.9   HEMATOCRIT % 43.5  --  43.9   MCV fL 86  --  87   PLATELETS Thousands/uL 216 214 241   , BMP/CMP:   Results from last 7 days   Lab Units 10/02/23  0430 10/01/23  2100   SODIUM mmol/L 139 139   POTASSIUM mmol/L 3.5 3.7   CHLORIDE mmol/L 107 105   CO2 mmol/L 26 27   BUN mg/dL 24 31*   CREATININE mg/dL 1.14 1.27   CALCIUM mg/dL 9.1 9.1   EGFR ml/min/1.73sq m 60 53   , Vitamin B12:   , HgBA1C:   Results from last 7 days   Lab Units 10/02/23  0430   HEMOGLOBIN A1C % 5.8*   , TSH:   Results from last 7 days   Lab Units 10/01/23  2100   TSH 3RD GENERATON uIU/mL 5.252*   , Coagulation:   Results from last 7 days   Lab Units 10/01/23  2100   INR  0.98   , Lipid Profile:   Results from last 7 days   Lab Units 10/02/23  0430   HDL mg/dL 41   LDL CALC mg/dL 103*   TRIGLYCERIDES mg/dL 81   , Ammonia:   , Urinalysis:       Invalid input(s): "URIBILINOGEN", Drug Screen:   , Medication Drug Levels:       Invalid input(s): "CARBAMAZEPINE", "LACOSAMIDE", "OXCARBAZEPINE"     Imaging Studies: I have personally reviewed pertinent reports. and I have personally reviewed pertinent films in PACS     EKG, Pathology, and Other Studies: I have personally reviewed pertinent reports. Code Status: Level 1 - Full Code    Counseling / Coordination of Care  Assessment, images, and plan reviewed with Dr. Fern Padron.  Plan discussed with patient and primary service

## 2023-10-03 PROBLEM — R13.10 DYSPHAGIA: Status: ACTIVE | Noted: 2023-10-03

## 2023-10-03 LAB
ANION GAP SERPL CALCULATED.3IONS-SCNC: 4 MMOL/L
BUN SERPL-MCNC: 23 MG/DL (ref 5–25)
CALCIUM SERPL-MCNC: 9 MG/DL (ref 8.4–10.2)
CHLORIDE SERPL-SCNC: 108 MMOL/L (ref 96–108)
CO2 SERPL-SCNC: 29 MMOL/L (ref 21–32)
CREAT SERPL-MCNC: 1.04 MG/DL (ref 0.6–1.3)
ERYTHROCYTE [DISTWIDTH] IN BLOOD BY AUTOMATED COUNT: 13.2 % (ref 11.6–15.1)
GFR SERPL CREATININE-BSD FRML MDRD: 67 ML/MIN/1.73SQ M
GLUCOSE SERPL-MCNC: 117 MG/DL (ref 65–140)
HCT VFR BLD AUTO: 41.7 % (ref 36.5–49.3)
HGB BLD-MCNC: 14.1 G/DL (ref 12–17)
MCH RBC QN AUTO: 29.3 PG (ref 26.8–34.3)
MCHC RBC AUTO-ENTMCNC: 33.8 G/DL (ref 31.4–37.4)
MCV RBC AUTO: 87 FL (ref 82–98)
PLATELET # BLD AUTO: 201 THOUSANDS/UL (ref 149–390)
PMV BLD AUTO: 10.3 FL (ref 8.9–12.7)
POTASSIUM SERPL-SCNC: 3.7 MMOL/L (ref 3.5–5.3)
RBC # BLD AUTO: 4.81 MILLION/UL (ref 3.88–5.62)
SODIUM SERPL-SCNC: 141 MMOL/L (ref 135–147)
WBC # BLD AUTO: 10.68 THOUSAND/UL (ref 4.31–10.16)

## 2023-10-03 PROCEDURE — 99232 SBSQ HOSP IP/OBS MODERATE 35: CPT | Performed by: PSYCHIATRY & NEUROLOGY

## 2023-10-03 PROCEDURE — 92526 ORAL FUNCTION THERAPY: CPT

## 2023-10-03 PROCEDURE — 99232 SBSQ HOSP IP/OBS MODERATE 35: CPT

## 2023-10-03 PROCEDURE — 85027 COMPLETE CBC AUTOMATED: CPT

## 2023-10-03 PROCEDURE — 80048 BASIC METABOLIC PNL TOTAL CA: CPT

## 2023-10-03 RX ORDER — HYDRALAZINE HYDROCHLORIDE 20 MG/ML
5 INJECTION INTRAMUSCULAR; INTRAVENOUS EVERY 6 HOURS PRN
Status: DISCONTINUED | OUTPATIENT
Start: 2023-10-03 | End: 2023-10-07 | Stop reason: HOSPADM

## 2023-10-03 RX ADMIN — HEPARIN SODIUM 5000 UNITS: 5000 INJECTION INTRAVENOUS; SUBCUTANEOUS at 05:21

## 2023-10-03 RX ADMIN — HEPARIN SODIUM 5000 UNITS: 5000 INJECTION INTRAVENOUS; SUBCUTANEOUS at 21:23

## 2023-10-03 RX ADMIN — HYDRALAZINE HYDROCHLORIDE 5 MG: 20 INJECTION INTRAMUSCULAR; INTRAVENOUS at 12:49

## 2023-10-03 RX ADMIN — DEXTROSE, SODIUM CHLORIDE, SODIUM LACTATE, POTASSIUM CHLORIDE, AND CALCIUM CHLORIDE 75 ML/HR: 5; .6; .31; .03; .02 INJECTION, SOLUTION INTRAVENOUS at 19:21

## 2023-10-03 RX ADMIN — DEXTROSE, SODIUM CHLORIDE, SODIUM LACTATE, POTASSIUM CHLORIDE, AND CALCIUM CHLORIDE 75 ML/HR: 5; .6; .31; .03; .02 INJECTION, SOLUTION INTRAVENOUS at 06:10

## 2023-10-03 RX ADMIN — HEPARIN SODIUM 5000 UNITS: 5000 INJECTION INTRAVENOUS; SUBCUTANEOUS at 13:10

## 2023-10-03 NOTE — ASSESSMENT & PLAN NOTE
Lipid panel showed total cholesterol 160, triglyceride 81, HDL 41,   Started on atorvastatin 40 mg nightly

## 2023-10-03 NOTE — ASSESSMENT & PLAN NOTE
Stroke alert presenting with slurred speech, dysphagia and numbness near right eye. Symptoms improving. Has mildly slurred speech. Hx of TIA. · Stroke alert, neurology following. Appreciate recommendations. Recommended aspirin and Plavix load continuing with aspirin 81 mg daily followed by Plavix 75 mg daily  · Reports hx of stroke 4 years ago, not on asa or plavix currently.    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 given dysphagia  · Neuro consulted, started myasthenia gravis work-up, however after further discussion believes etiology is likely TIA we will continue to evaluate for an additional 24 hours

## 2023-10-03 NOTE — PLAN OF CARE
Problem: MOBILITY - ADULT  Goal: Maintain or return to baseline ADL function  Description: INTERVENTIONS:  -  Assess patient's ability to carry out ADLs; assess patient's baseline for ADL function and identify physical deficits which impact ability to perform ADLs (bathing, care of mouth/teeth, toileting, grooming, dressing, etc.)  - Assess/evaluate cause of self-care deficits   - Assess range of motion  - Assess patient's mobility; develop plan if impaired  - Assess patient's need for assistive devices and provide as appropriate  - Encourage maximum independence but intervene and supervise when necessary  - Involve family in performance of ADLs  - Assess for home care needs following discharge   - Consider OT consult to assist with ADL evaluation and planning for discharge  - Provide patient education as appropriate  Outcome: Progressing  Goal: Maintains/Returns to pre admission functional level  Description: INTERVENTIONS:  - Perform BMAT or MOVE assessment daily.   - Set and communicate daily mobility goal to care team and patient/family/caregiver. - Collaborate with rehabilitation services on mobility goals if consulted  - Perform Range of Motion 3 times a day. - Reposition patient every 2 hours.   - Dangle patient 3 times a day  - Stand patient 3 times a day  - Ambulate patient 3 times a day  - Out of bed to chair 3 times a day   - Out of bed for meals 3 times a day  - Out of bed for toileting  - Record patient progress and toleration of activity level   Outcome: Progressing     Problem: PAIN - ADULT  Goal: Verbalizes/displays adequate comfort level or baseline comfort level  Description: Interventions:  - Encourage patient to monitor pain and request assistance  - Assess pain using appropriate pain scale  - Administer analgesics based on type and severity of pain and evaluate response  - Implement non-pharmacological measures as appropriate and evaluate response  - Consider cultural and social influences on pain and pain management  - Notify physician/advanced practitioner if interventions unsuccessful or patient reports new pain  Outcome: Progressing     Problem: INFECTION - ADULT  Goal: Absence or prevention of progression during hospitalization  Description: INTERVENTIONS:  - Assess and monitor for signs and symptoms of infection  - Monitor lab/diagnostic results  - Monitor all insertion sites, i.e. indwelling lines, tubes, and drains  - Monitor endotracheal if appropriate and nasal secretions for changes in amount and color  - [place] appropriate cooling/warming therapies per order  - Administer medications as ordered  - Instruct and encourage patient and family to use good hand hygiene technique  - Identify and instruct in appropriate isolation precautions for identified infection/condition  Outcome: Progressing  Goal: Absence of fever/infection during neutropenic period  Description: INTERVENTIONS:  - Monitor WBC    Outcome: Progressing     Problem: SAFETY ADULT  Goal: Maintain or return to baseline ADL function  Description: INTERVENTIONS:  -  Assess patient's ability to carry out ADLs; assess patient's baseline for ADL function and identify physical deficits which impact ability to perform ADLs (bathing, care of mouth/teeth, toileting, grooming, dressing, etc.)  - Assess/evaluate cause of self-care deficits   - Assess range of motion  - Assess patient's mobility; develop plan if impaired  - Assess patient's need for assistive devices and provide as appropriate  - Encourage maximum independence but intervene and supervise when necessary  - Involve family in performance of ADLs  - Assess for home care needs following discharge   - Consider OT consult to assist with ADL evaluation and planning for discharge  - Provide patient education as appropriate  Outcome: Progressing  Goal: Maintains/Returns to pre admission functional level  Description: INTERVENTIONS:  - Perform BMAT or MOVE assessment daily.   - Set and communicate daily mobility goal to care team and patient/family/caregiver. - Collaborate with rehabilitation services on mobility goals if consulted  - Perform Range of Motion 3 times a day. - Reposition patient every 2 hours.   - Dangle patient 3 times a day  - Stand patient 3 times a day  - Ambulate patient 3 times a day  - Out of bed to chair 3 times a day   - Out of bed for meals 3 times a day  - Out of bed for toileting  - Record patient progress and toleration of activity level   Outcome: Progressing  Goal: Patient will remain free of falls  Description: INTERVENTIONS:  - Educate patient/family on patient safety including physical limitations  - Instruct patient to call for assistance with activity   - Consult OT/PT to assist with strengthening/mobility   - Keep Call bell within reach  - Keep bed low and locked with side rails adjusted as appropriate  - Keep care items and personal belongings within reach  - Initiate and maintain comfort rounds  - Make Fall Risk Sign visible to staff  - Offer Toileting every 4 Hours, in advance of need  - Initiate/Maintain bed alarm  - Obtain necessary fall risk management equipment:   - Apply yellow socks and bracelet for high fall risk patients  - Consider moving patient to room near nurses station  Outcome: Progressing     Problem: DISCHARGE PLANNING  Goal: Discharge to home or other facility with appropriate resources  Description: INTERVENTIONS:  - Identify barriers to discharge w/patient and caregiver  - Arrange for needed discharge resources and transportation as appropriate  - Identify discharge learning needs (meds, wound care, etc.)  - Arrange for interpretive services to assist at discharge as needed  - Refer to Case Management Department for coordinating discharge planning if the patient needs post-hospital services based on physician/advanced practitioner order or complex needs related to functional status, cognitive ability, or social support system  Outcome: Progressing     Problem: Knowledge Deficit  Goal: Patient/family/caregiver demonstrates understanding of disease process, treatment plan, medications, and discharge instructions  Description: Complete learning assessment and assess knowledge base.   Interventions:  - Provide teaching at level of understanding  - Provide teaching via preferred learning methods  Outcome: Progressing

## 2023-10-03 NOTE — SPEECH THERAPY NOTE
,Speech Language/Pathology     Speech/Language Pathology Progress Note     Patient Name: Mariusz Isaac    UWBAQ'D Date: 10/3/2023     Problem List  Principal Problem:    Stroke-like symptoms  Active Problems:    CKD (chronic kidney disease)    Essential hypertension    History of CVA (cerebrovascular accident)    Hypothyroidism    Hyperlipidemia    HFrEF (heart failure with reduced ejection fraction) ([facility])     Subjective:  Patient received awake and alert, kidney dish w/ sputum noted on bedside table. "I'm no better today"    Previous/current diet: NPO     Objective:  Pt elicits dry volitional swallows upon verbal cue. Laryngeal rise palpated and judged to be delayed and intermittently ?incomplete. Patient then with increasingly wet vocal quality, audible congestion. Unable to clear or manage secretions, resulting in expectoration. Suction set up at bedside and [facility] re: reflux/aspiration precautions provided to patient. RN present and made aware of aspiration risk. Given the above, recommend to continue strict NPO w/ alt medication route.        Assessment:  Suspect oropharyngeal dysphagia with risk of aspiration; pt presently in process of MG workup       Plan:  Strict NPO  Suction set up  Oral care  [facility] elevation >45'  Aspiration precautions  Consider alt medication route   D/w PA, JUAN Rasmussen, [id number] [facility], [address] [facility]  Speech-Language Pathologist  [place] #[id number]  [place] #[id number]

## 2023-10-03 NOTE — PLAN OF CARE
Strict NPO  Suction set up  Oral care  [facility] elevation >45'  Aspiration precautions  Consider alt medication route   D/w PA, RN

## 2023-10-03 NOTE — ASSESSMENT & PLAN NOTE
· Dysphagia likely secondary to TIA, slurred speech improving today  · We will continue to have speech therapy evaluate, current recommendations n.p.o.  · Continue with myasthenia gravis work-up  · We will defer NGT and diet supplementation at this time, may need to be reevaluated if n.p.o. status continues

## 2023-10-03 NOTE — ASSESSMENT & PLAN NOTE
Pressure on arrival 180s, repeat 160s without intervention.   Hold home dose Coreg, amlodipine, lisinopril and HCTZ, may restart medications however many which are oral, will give IV hydralazine for SBP greater than 160  Monitor vitals closely

## 2023-10-03 NOTE — CONSULTS
PHYSICAL MEDICINE AND REHABILITATION CONSULT NOTE  Delilah Montenegro 77 y.o. 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 male who  has a past medical history of Hypertension and TIA (transient ischemic attack). who presented to the [address] [place] [address] 10/1 with slurred speech, dysphagia and numbness near the right eye. Stroke alert was initiated and neurology recommended aspirin and Plavix. Patient has a history of stroke 4 years ago but was not on aspirin or Plavix currently. CTA revealed occlusion of the V3 and V4 segment of the right vertebral artery which could represent acute or chronic occlusion. Patient initially passed the dysphagia screening but later on in the day his symptoms worsen for which a repeat head CT was ordered. Currently patient is lying in bed comfortably family at bedside. Patient was examined around 2:30 PM.    PM&R consulted for rehabilitation recommendations. Restrictions include:  none    Hospital Complications/Comorbidities:   Complications: As above  Comorbidities: As above    Morbid Obesity (BMI > 40) No     Last Weight Last BMI   Wt Readings from Last 1 Encounters:   10/02/23 98.4 kg (217 lb)    Body mass index is 32.05 kg/m². Functional History:     Prior to Admission: Independent with functional mobility, ADLs and IADLs. Present:  Physical Therapy Occupational Therapy Speech Therapy   Provisional sit to stand and stand to sit transfers, shuffling gait, 250 feet with assist of 1 and verbal cues. Supervision with supine to sit, sit to supine and sit to stand transfers.   Supervision with stand pivot transfers. Patient is NPO due to significant dysphagia. Past Medical History:   Past Surgical History:   Family History:     Past Medical History:   Diagnosis Date   • Hypertension    • TIA (transient ischemic attack)     History reviewed. No pertinent surgical history. History reviewed. No pertinent family history.  - No family history of neurologic diseases.       Medications:    Current Facility-Administered Medications:   •  aspirin chewable tablet 81 mg, 81 mg, Oral, Daily, Yulia Galinagiovanna Miller, DO  •  aspirin tablet 325 mg, 325 mg, Oral, Once, Yulia Galinagiovanna Miller, DO  •  atorvastatin (LIPITOR) tablet 40 mg, 40 mg, Oral, QPM, [facility] Paul, DO  •  clopidogrel (PLAVIX) tablet 300 mg, 300 mg, Oral, Once, [facility] Paul, DO  •  dextrose 5 % in lactated Ringer's infusion, 75 mL/hr, Intravenous, Continuous, Sole Marcano PA-C, Last Rate: 75 mL/hr at 10/02/23 2013, 75 mL/hr at 10/02/23 2013  •  heparin (porcine) subcutaneous injection 5,000 Units, 5,000 Units, Subcutaneous, Q8H [facility], 5,000 Units at 10/02/23 2100 **AND** [COMPLETED] Platelet count, , , Once, [facility]  •  levothyroxine tablet 75 mcg, 75 mcg, Oral, Early Morning, [facility] Paul, DO  •  pyridostigmine (MESTINON) tablet 30 mg, 30 mg, Oral, TID, LUDWIN Montejo    Allergies:   No Known Allergies     Social History:    Social History     Socioeconomic History   • Marital status: Single     Spouse name: None   • Number of children: None   • Years of education: None   • Highest education level: None   Occupational History   • None   Tobacco Use   • Smoking status: Never   • Smokeless tobacco: Never   Vaping Use   • Vaping Use: Never used   Substance and Sexual Activity   • Alcohol use: Never   • Drug use: Never   • Sexual activity: None   Other Topics Concern   • None   Social History Narrative   • None     Social Determinants of Health     Financial Resource Strain: Not on file   Food Insecurity: No Food Insecurity (10/2/2023)    Hunger Vital Sign    • Worried About Running Out of Food in the Last Year: Never true    • Ran Out of Food in the Last Year: Never true   Transportation Needs: No Transportation Needs (10/2/2023)    PRAPARE - Transportation    • Lack of Transportation (Medical): No    • Lack of Transportation (Non-Medical): No   Physical Activity: Not on file   Stress: Not on file   Social Connections: Not on file   Intimate Partner Violence: Not on file   Housing Stability: Low Risk  (10/2/2023)    Housing Stability Vital Sign    • Unable to Pay for Housing in the Last Year: No    • Number of Places Lived in the Last Year: 2    • Unstable Housing in the Last Year: No        Eugenio Alonso lives in a two-level house with one-step to enter, able to live on main level with bedroom/bathroom, performs ADLs on 1 level. Review of Systems: A 10-point review of systems was performed. Negative except as listed above. Physical Exam:  Vital Signs:      Temp:  [98 °F (36.7 °C)-98.5 °F (36.9 °C)] 98 °F (36.7 °C)  HR:  [48-63] 60  Resp:  [17-28] 24  BP: (142-183)/() 163/67   Intake/Output Summary (Last 24 hours) at 10/2/2023 2232  Last data filed at 10/2/2023 0445  Gross per 24 hour   Intake --   Output 300 ml   Net -300 ml        Laboratory:      Lab Results   Component Value Date    HGB 14.7 10/02/2023    HCT 43.5 10/02/2023    WBC 6.73 10/02/2023     Lab Results   Component Value Date    BUN 24 10/02/2023    K 3.5 10/02/2023     10/02/2023    CREATININE 1.14 10/02/2023     Lab Results   Component Value Date    PROTIME 13.6 10/01/2023    INR 0.98 10/01/2023        General: alert, no apparent distress, cooperative and comfortable  Head: Normal, normocephalic, atraumatic. Eye: Normal external eye, conjunctiva, lids   Ears: Normal external ears  Nose: Normal external nose, mucus membranes. Pharynx: Dental Hygiene adequate. Normal buccal mucosa. Normal pharynx.   Neck / Thyroid: Supple, no masses, nodes, nodules or enlargement. Abdomen: soft, nontender, nondistended, no masses or organomegaly  Skin/Extremity: no rashes, no erythema, no peripheral edema    Neurologic: Speech reveals mild dysarthria. Radial nerves II to XII intact except mild ptosis on repetitive vertical movement. Sensations are intact to light touch and pinprick bilaterally. No dysmetria on finger-to-nose examination. Reflexes are symmetric bilaterally. Normal gait per PT. Psych: Appropriate affect, alert and oriented to person, place and time   Musculoskeletal - Strength:   Right  Left  Site  Right  Left  Site    5 5  S Ab: Shoulder Abductors  5  5  HF: Hip Flexors    5 5  EF: Elbow Flexors  5 5 KF: Knee Flexors    5  5  EE: Elbow Extensors  5  5  KE: Knee Extensors    5  5  WE: Wrist Extensors  5  5  DR: Dorsi Flexors    5  5  FF: Finger Flexors  5  5  PF: Plantar Flexors    5  5  HI: Hand Intrinsics  5  5  EHL: Extensor Hallucis Longus         Imaging: Reviewed  MRI brain wo contrast    Result Date: 10/2/2023  Impression: 1. No acute infarction, edema, or mass effect. 2. Mild chronic microangiopathic ischemic changes. Workstation performed: [id number]     CT head wo contrast    Result Date: 10/2/2023  Impression: No acute intracranial abnormality. Workstation performed: [id number]     CT stroke alert brain    Result Date: 10/1/2023  Impression: 1. 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 Mild stenosis at both terminal ICAs I personally discussed impression 1 with Joel Wadsworth 10/1/23 9:20 PM Workstation performed: [id number]     CTA stroke alert (head/neck)    Result Date: 10/1/2023  Impression: 1. 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   Mild stenosis at both terminal ICAs I personally discussed impression 1 with Joel Wadsworth 10/1/23 9:20 PM Workstation performed: [id number]       Assessment and Recommendations:      Impairments:  Impaired functional mobility and ability to perform ADL's  Impaired  speech    Recommendations:  - Chart reviewed  - Imaging reviewed   - Continue PT/OT/SLP while inpatient. - Pt is unable to safely return home until he is at the supervision/contact-guard functional level (25% assistance level with or without a device)  modified-independent functional level (0% assistance with a device) independent functional level (0% assistance without a device)             - Based upon patient's current functional level, we recommend referral to outpatient rehabilitation facility to allow for achievement of modified-independent functional level.     - Disposition unclear at this point in time but inpatient rehab a possibility in the near future pending achievement of clinically stability, potential for functional progress. Thank you for allowing the PM&R service to participate in the care of this patient. We will continue to follow Anahi Flaherty progress with you. Please do not hesitate to call with questions or concerns    ** Please Note: Fluency Direct voice to text software may have been used in the creation of this document.  **

## 2023-10-03 NOTE — ASSESSMENT & PLAN NOTE
Lab Results   Component Value Date    EGFR 67 10/03/2023    EGFR 60 10/02/2023    EGFR 53 10/01/2023    CREATININE 1.04 10/03/2023    CREATININE 1.14 10/02/2023    CREATININE 1.27 10/01/2023   Per chart review creatinine appears to be at baseline  Avoid nephrotoxic agents  Monitor creatinine with a.m.  BMP

## 2023-10-03 NOTE — PLAN OF CARE
Problem: MOBILITY - ADULT  Goal: Maintain or return to baseline ADL function  Description: INTERVENTIONS:  -  Assess patient's ability to carry out ADLs; assess patient's baseline for ADL function and identify physical deficits which impact ability to perform ADLs (bathing, care of mouth/teeth, toileting, grooming, dressing, etc.)  - Assess/evaluate cause of self-care deficits   - Assess range of motion  - Assess patient's mobility; develop plan if impaired  - Assess patient's need for assistive devices and provide as appropriate  - Encourage maximum independence but intervene and supervise when necessary  - Involve family in performance of ADLs  - Assess for home care needs following discharge   - Consider OT consult to assist with ADL evaluation and planning for discharge  - Provide patient education as appropriate  Outcome: Progressing  Goal: Maintains/Returns to pre admission functional level  Description: INTERVENTIONS:  - Perform BMAT or MOVE assessment daily.   - Set and communicate daily mobility goal to care team and patient/family/caregiver. - Collaborate with rehabilitation services on mobility goals if consulted  - Perform Range of Motion  times a day. - Reposition patient every  hours.   - Dangle patient  times a day  - Stand patient  times a day  - Ambulate patient  times a day  - Out of bed to chair  times a day   - Out of bed for meals  times a day  - Out of bed for toileting  - Record patient progress and toleration of activity level   Outcome: Progressing     Problem: PAIN - ADULT  Goal: Verbalizes/displays adequate comfort level or baseline comfort level  Description: Interventions:  - Encourage patient to monitor pain and request assistance  - Assess pain using appropriate pain scale  - Administer analgesics based on type and severity of pain and evaluate response  - Implement non-pharmacological measures as appropriate and evaluate response  - Consider cultural and social influences on pain and pain management  - Notify physician/advanced practitioner if interventions unsuccessful or patient reports new pain  Outcome: Progressing     Problem: INFECTION - ADULT  Goal: Absence or prevention of progression during hospitalization  Description: INTERVENTIONS:  - Assess and monitor for signs and symptoms of infection  - Monitor lab/diagnostic results  - Monitor all insertion sites, i.e. indwelling lines, tubes, and drains  - Monitor endotracheal if appropriate and nasal secretions for changes in amount and color  - [place] appropriate cooling/warming therapies per order  - Administer medications as ordered  - Instruct and encourage patient and family to use good hand hygiene technique  - Identify and instruct in appropriate isolation precautions for identified infection/condition  Outcome: Progressing  Goal: Absence of fever/infection during neutropenic period  Description: INTERVENTIONS:  - Monitor WBC    Outcome: Progressing     Problem: SAFETY ADULT  Goal: Maintain or return to baseline ADL function  Description: INTERVENTIONS:  -  Assess patient's ability to carry out ADLs; assess patient's baseline for ADL function and identify physical deficits which impact ability to perform ADLs (bathing, care of mouth/teeth, toileting, grooming, dressing, etc.)  - Assess/evaluate cause of self-care deficits   - Assess range of motion  - Assess patient's mobility; develop plan if impaired  - Assess patient's need for assistive devices and provide as appropriate  - Encourage maximum independence but intervene and supervise when necessary  - Involve family in performance of ADLs  - Assess for home care needs following discharge   - Consider OT consult to assist with ADL evaluation and planning for discharge  - Provide patient education as appropriate  Outcome: Progressing  Goal: Maintains/Returns to pre admission functional level  Description: INTERVENTIONS:  - Perform BMAT or MOVE assessment daily.   - Set and communicate daily mobility goal to care team and patient/family/caregiver. - Collaborate with rehabilitation services on mobility goals if consulted  - Perform Range of Motion  times a day. - Reposition patient every  hours.   - Dangle patient  times a day  - Stand patient  times a day  - Ambulate patient  times a day  - Out of bed to chair  times a day   - Out of bed for meals  times a day  - Out of bed for toileting  - Record patient progress and toleration of activity level   Outcome: Progressing  Goal: Patient will remain free of falls  Description: INTERVENTIONS:  - Educate patient/family on patient safety including physical limitations  - Instruct patient to call for assistance with activity   - Consult OT/PT to assist with strengthening/mobility   - Keep Call bell within reach  - Keep bed low and locked with side rails adjusted as appropriate  - Keep care items and personal belongings within reach  - Initiate and maintain comfort rounds  - Make Fall Risk Sign visible to staff  - Offer Toileting every  Hours, in advance of need  - Initiate/Maintain alarm  - Obtain necessary fall risk management equipment:   - Apply yellow socks and bracelet for high fall risk patients  - Consider moving patient to room near nurses station  Outcome: Progressing     Problem: DISCHARGE PLANNING  Goal: Discharge to home or other facility with appropriate resources  Description: INTERVENTIONS:  - Identify barriers to discharge w/patient and caregiver  - Arrange for needed discharge resources and transportation as appropriate  - Identify discharge learning needs (meds, wound care, etc.)  - Arrange for interpretive services to assist at discharge as needed  - Refer to Case Management Department for coordinating discharge planning if the patient needs post-hospital services based on physician/advanced practitioner order or complex needs related to functional status, cognitive ability, or social support system  Outcome: Progressing Problem: Knowledge Deficit  Goal: Patient/family/caregiver demonstrates understanding of disease process, treatment plan, medications, and discharge instructions  Description: Complete learning assessment and assess knowledge base.   Interventions:  - Provide teaching at level of understanding  - Provide teaching via preferred learning methods  Outcome: Progressing     Problem: Prexisting or High Potential for Compromised Skin Integrity  Goal: Skin integrity is maintained or improved  Description: INTERVENTIONS:  - Identify patients at risk for skin breakdown  - Assess and monitor skin integrity  - Assess and monitor nutrition and hydration status  - Monitor labs   - Assess for incontinence   - Turn and reposition patient  - Assist with mobility/ambulation  - Relieve pressure over bony prominences  - Avoid friction and shearing  - Provide appropriate hygiene as needed including keeping skin clean and dry  - Evaluate need for skin moisturizer/barrier cream  - Collaborate with interdisciplinary team   - Patient/family teaching  - Consider wound care consult   Outcome: Progressing

## 2023-10-03 NOTE — PROGRESS NOTES
Progress Note - Neurology   Billy Balbuena 77 y.o. male MRN: [id number]  Unit/Bed#: -01 Encounter: [id number]      Assessment/Plan     * Stroke-like symptoms  Assessment & Plan  Billy Balbuena is a 77 y.o.  male with prior stroke 2019, HTN, HLD, HFrEF, hypothyroid, and CKD who presented to [facility] 10/1/23 with sudden onset slurred speech, difficulty swallowing and a small area of numbness around his right eye. In the ED, patient and family reported that the numbness resolved and dysarthria/dysphagia were improving. Stroke alert initiated. NIHSS 1 for mild dysarthria. /84, . CTH/CTA notable for right V3 and V4 occlusion (also noted on CTA report 2019). 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 He was not a candidate for TNK as his symptoms were improving with low NIH. He was recommended to be loaded with  mg and Plavix 300 mg but unfortunately failed swallow evaluation. Later that evening, he had significant worsening of his dysphagia. He was made NPO, hence only received  mg [place]. Repeat CTH stable. Work-up:  Imaging:  10/1/23 CTH/CTA:   1.  Occlusion of the V3 and V4 segments of the right vertebral artery as described, this could represent acute or chronic occlusion  2. Mild stenosis at both terminal ICAs  10/1/23 Repeat CTH; No acute intracranial abnormality. 10/2/23 MRI brain without contrast  1. No acute infarction, edema, or mass effect. 2. Mild chronic microangiopathic ischemic changes.     Pertinent labs:  Lipid panel:, TG 81, HDL 41,   TSH 5.2  A1c 5.8    Etiology of symptoms is unclear, differential includes MRI negative stroke vs myasthenia gravis. Per description of event with sudden onset and also similar prior episode 4 years ago, concern for TIA vs small MRI negative stroke.      Recommendations:  Stroke vs possible myasthenia  - Stroke pathway  • Echocardiogram reviewed, EF 50%, aortic valve sclerosis, atria size normal B/L, limited bubble study negative but probably nondiagnostic due to technical difficulties. • Hemoglobin A1c reviewed, 5.8  • TSH elevated; T4 pending  • Recommend continue on  mg IL while NPO. • Atorvastatin 40 mg daily. • Goal normotension. • Euglycemic, normothermic goal  • Continue telemetry  • PT/OT/ST  • Stroke education  • Myasthenia gravis labs pending  • Discussed with patient and his daughter at bedside today, will continue to monitor overnight and continue to work with Speech therapy. If patient unable to tolerate PO tomorrow, will need to consider NG tube placement for nutrition. • Continue to monitor and notify neurology with any changes. - Medical management andcorrection of any metabolic or infectious disturbances per primary service. Essential hypertension  Assessment & Plan  - Goal normotension, avoid hypotension.   - Management as per primary team.    History of CVA (cerebrovascular accident)  Assessment & Plan  - In regard to his prior stroke, he was evaluated at [facility] for dysphagia, slurred speech and intermittent vertigo. MRI brain 6/27/2019 revealed " Tiny acute small vessel infarctions in the right lateral lower brainstem medulla and adjacent right lower cerebellar vermis. No findings to explain dysphagia or slurred speech." CTA H/N showed, "Short segment occlusion distal right vertebral artery as above, acute versus chronic. Mild focal plaquing and estimated 50% stenoses bilateral cavernous ICA. "  - Recommend  mg IL while NPO. Hyperlipidemia  Assessment & Plan  - Continue atorvastatin 40mg daily once able to take PO.  - LDL goal <70. Hypothyroidism  Assessment & Plan  - TSH 5.2; recommend checking T4        Recommendations for outpatient neurological follow up have yet to be determined.     Subjective:   Patient reports his speech has improved since yesterday but he continues to be NPO and failed swallow evaluation this AM with speech therapy. He denies any other complaints. Per discussion with patient's daughter at bedside, this episode was identical to episode 4 years ago when he was diagnosed with stroke. She notes symptoms began suddenly while he was eating dinner and had difficulty swallowing and with his speech.     ROS:  History obtained from the patient  General ROS: negative for - chills, fatigue or fever  Ophthalmic ROS: negative for - blurry vision, decreased vision or double vision  ENT ROS: positive for - dysphagia  Respiratory ROS: positive for - shortness of breath  Cardiovascular ROS: negative for - chest pain  Gastrointestinal ROS: negative for - abdominal pain or nausea/vomiting  Musculoskeletal ROS: negative for - gait disturbance or muscular weakness  Neurological ROS: positive for - speech problems  negative for - confusion, dizziness, headaches, impaired coordination/balance, numbness/tingling, visual changes or weakness    Medications  Scheduled Meds:  Current Facility-Administered Medications   Medication Dose Route Frequency Provider Last Rate   • aspirin  81 mg Oral Daily [facility]     • aspirin  325 mg Oral Once Elvertatum Cardona, DO     • atorvastatin  40 mg Oral QPM [facility]     • clopidogrel  300 mg Oral Once [facility] Paul, DO     • dextrose 5% lactated ringer's  75 mL/hr Intravenous Continuous Chaitanya Marcano PA-C 75 mL/hr (10/03/23 0610)   • heparin (porcine)  5,000 Units Subcutaneous [facility]     • hydrALAZINE  5 mg Intravenous Q6H PRN Chaitanya Marcano PA-C     • levothyroxine  75 mcg Oral Early Morning [facility], DO     • pyridostigmine  30 mg Oral TID LUDWIN Wolff       Continuous Infusions:dextrose 5% lactated ringer's, 75 mL/hr, Last Rate: 75 mL/hr (10/03/23 0610)      PRN Meds:.•  hydrALAZINE    Vitals: Blood pressure 163/68, pulse (!) 50, temperature 98.3 °F (36.8 °C), resp. rate 19, height 5' 9" (1.753 m), weight 98.4 kg (217 lb), SpO2 93 %. ,Body mass index is 32.05 kg/m².     Physical Exam: /68   Pulse (!) 50   Temp 98.3 °F (36.8 °C)   Resp 19   Ht 5' 9" (1.753 m)   Wt 98.4 kg (217 lb)   SpO2 93%   BMI 32.05 kg/m²   General appearance: alert and oriented, in no acute distress and speech moderately dysarthric but per discussion with attending neurologist is improved compared to exam on 10/2/23  Head: Normocephalic, without obvious abnormality, atraumatic  Eyes: negative findings: lids and lashes normal, conjunctivae and sclerae normal, pupils equal, round, reactive to light and accomodation and mild ptosis noted B/L  Extremities: extremities normal, warm and well-perfused; no cyanosis, clubbing, or edema  Skin: Skin color, texture, turgor normal. No rashes or lesions  Neurologic: Mental status: Alert, oriented, thought content appropriate  Cranial nerves: II: visual field normal, II: pupils equal, round, reactive to light and accommodation, III,VII: ptosis Mild B/L ptosis noted, III,IV,VI: extraocular muscles extra-ocular motions intact, VII: upper facial muscle function normal bilaterally, VII: lower facial muscle function normal bilaterally, XII: tongue strength normal  Sensory: Grossly intact to crude touch  Motor: Moves all extremities and follows commands B/L UE and LE  Coordination: finger to nose normal bilaterally    Labs  Recent Results (from the past 24 hour(s))   Echo complete w/ contrast if indicated    Collection Time: 10/02/23  2:48 PM   Result Value Ref Range    AV peak gradient 45 mmHg    LA size 4 cm    Aortic valve mean velocity 9.60 m/s    Triscuspid Valve Regurgitation Peak Gradient 17.0 mmHg    Tricuspid valve peak regurgitation velocity 2.08 m/s    LVPWd 1.00 cm    Left Atrium Area-systolic Apical Two Chamber 25.9 cm2    Left Atrium Area-systolic Four Chamber 21 cm2    MV E' Tissue Velocity Septal 6 cm/s    Tricuspid annular plane systolic excursion 7.91 cm    TR Peak Layton 2.1 m/s    IVSd 7.35 cm    LV DIASTOLIC VOLUME (MOD BIPLANE) 2D 143 mL    LEFT VENTRICLE SYSTOLIC VOLUME (MOD BIPLANE) 2D 41 mL    Left ventricular stroke volume (2D) 103.00 mL    AV Deceleration Time 1,801 ms    A4C EF 68 %    LA length (A2C) 6.50 cm    LVIDd 5.40 cm    IVS 1.1 cm    LVIDS 3.20 cm    FS 41 28 - 44 %    LA volume (BP) 76 mL    Asc Ao 3.3 cm    Ao root 3.80 cm    RVID d 3.6 cm    LVOT mn grad 3.0 mmHg    AV regurgitation pressure 1/2 time 522 ms    AV mean gradient 4 mmHg    AV LVOT peak gradient 6 mmHg    MV valve area p 1/2 method 1.71 cm2    E wave deceleration time 445 ms    LVOT peak layton 1.18 m/s    LVOT peak VTI 25.93 cm    Aortic valve peak velocity 1.35 m/s    Ao VTI 27.31 cm    AV peak gradient 7 mmHg    MV Peak E Layton 69 cm/s    MV Peak A Layton 1 m/s    YULISSA A4C 21.5 cm2    RAA A4C 16 cm2    MV stenosis pressure 1/2 time 129 ms    LVSV, 2D 103 mL    DVI 0.87     LV EF 50    Basic metabolic panel    Collection Time: 10/03/23  5:02 AM   Result Value Ref Range    Sodium 141 135 - 147 mmol/L    Potassium 3.7 3.5 - 5.3 mmol/L    Chloride 108 96 - 108 mmol/L    CO2 29 21 - 32 mmol/L    ANION GAP 4 mmol/L    BUN 23 5 - 25 mg/dL    Creatinine 1.04 0.60 - 1.30 mg/dL    Glucose 117 65 - 140 mg/dL    Calcium 9.0 8.4 - 10.2 mg/dL    eGFR 67 ml/min/1.73sq m   CBC    Collection Time: 10/03/23  5:02 AM   Result Value Ref Range    WBC 10.68 (H) 4.31 - 10.16 Thousand/uL    RBC 4.81 3.88 - 5.62 Million/uL    Hemoglobin 14.1 12.0 - 17.0 g/dL    Hematocrit 41.7 36.5 - 49.3 %    MCV 87 82 - 98 fL    MCH 29.3 26.8 - 34.3 pg    MCHC 33.8 31.4 - 37.4 g/dL    RDW 13.2 11.6 - 15.1 %    Platelets 508 111 - 770 Thousands/uL    MPV 10.3 8.9 - 12.7 fL     Imaging   No new neuro imaging available for review. VTE Prophylaxis: Heparin    Counseling / Coordination of Care  Total time spent today 40 minutes.  Greater than 50% of total time was spent with the patient and / or family counseling and / or coordination of care. A description of the counseling / coordination of care: Time spent discussing results and plan of care with patient and his daughter at bedside.

## 2023-10-03 NOTE — PROGRESS NOTES
[address]  Progress Note  Name: Rylie Haynes  MRN: [id number]  Unit/Bed#: -01 I Date of Admission: 10/1/2023   Date of Service: 10/3/2023 I Hospital Day: 1    Assessment/Plan   * Stroke-like symptoms  Assessment & Plan  Stroke alert presenting with slurred speech, dysphagia and numbness near right eye. Symptoms improving. Has mildly slurred speech. Hx of TIA. · Stroke alert, neurology following. Appreciate recommendations. Recommended aspirin and Plavix load continuing with aspirin 81 mg daily followed by Plavix 75 mg daily  · Reports hx of stroke 4 years ago, not on asa or plavix currently.    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 given dysphagia  · Neuro consulted, started myasthenia gravis work-up, however after further discussion believes etiology is likely TIA we will continue to evaluate for an additional 24 hours      Dysphagia  Assessment & Plan  · Dysphagia likely secondary to TIA, slurred speech improving today  · We will continue to have speech therapy evaluate, current recommendations n.p.o.  · Continue with myasthenia gravis work-up  · We will defer NGT and diet supplementation at this time, may need to be reevaluated if n.p.o. status continues    HFrEF (heart failure with reduced ejection fraction) (HCC)  Assessment & Plan  Wt Readings from Last 3 Encounters:   10/02/23 98.4 kg (217 lb)       Per chart review has a history of heart failure with reduced EF on Lasix 20 mg daily  And appears euvolemic on exam  Currently not on Lasix, only on HCTZ  Intake and output  Monitor creatinine with a.m. BMP  We will need to continue to monitor respiratory status as we will start giving gentle IVF in the setting of n.p.o. status    Hyperlipidemia  Assessment & Plan  Lipid panel showed total cholesterol 160, triglyceride 81, HDL 41,   Started on atorvastatin 40 mg nightly    Hypothyroidism  Assessment & Plan  TSH elevated, obtain free T4  Pt reports not currently on levothyroxine    History of CVA (cerebrovascular accident)  Assessment & Plan  Patient has a history of CVA, does not appear to be on aspirin or Plavix. Stroke pathway as above    Essential hypertension  Assessment & Plan  Pressure on arrival 180s, repeat 160s without intervention. Hold home dose Coreg, amlodipine, lisinopril and HCTZ, may restart medications however many which are oral, will give IV hydralazine for SBP greater than 160  Monitor vitals closely    CKD (chronic kidney disease)  Assessment & Plan  Lab Results   Component Value Date    EGFR 67 10/03/2023    EGFR 60 10/02/2023    EGFR 53 10/01/2023    CREATININE 1.04 10/03/2023    CREATININE 1.14 10/02/2023    CREATININE 1.27 10/01/2023   Per chart review creatinine appears to be at baseline  Avoid nephrotoxic agents  Monitor creatinine with a.m. BMP         VTE Pharmacologic Prophylaxis: VTE Score: 8 High Risk (Score >/= 5) - Pharmacological DVT Prophylaxis Ordered: heparin. Sequential Compression Devices Ordered. Patient Centered Rounds: I performed bedside rounds with nursing staff today. Discussions with Specialists or Other Care Team Provider: Cm, neuro    Education and Discussions with Family / Patient: Updated  (daughter) at bedside. Total Time Spent on Date of Encounter in care of patient: 35 mins. This time was spent on one or more of the following: performing physical exam; counseling and coordination of care; obtaining or reviewing history; documenting in the medical record; reviewing/ordering tests, medications or procedures; communicating with other healthcare professionals and discussing with patient's family/caregivers.     Current Length of Stay: 1 day(s)  Current Patient Status: Inpatient   Certification Statement: The patient will continue to require additional inpatient hospital stay due to Continue evaluation under TIA and dysphagia requiring n.p.o. status  Discharge Plan: Anticipate discharge in 24-48 hrs to home with home services. Code Status: Level 1 - Full Code    Subjective:   Patient reports feeling well and speech is improving however he still continues to have episodes of choking when attempting to drink or eat anything. He denies chest pain/pressure, palpitations, and tenderness, generalized weakness, or shortness of breath. Objective:     Vitals:   Temp (24hrs), Av.1 °F (36.7 °C), Min:97.8 °F (36.6 °C), Max:98.5 °F (36.9 °C)    Temp:  [97.8 °F (36.6 °C)-98.5 °F (36.9 °C)] 97.8 °F (36.6 °C)  HR:  [51-63] 51  Resp:  [17-24] 19  BP: (160-180)/(65-77) 161/69  SpO2:  [94 %-97 %] 95 %  Body mass index is 32.05 kg/m². Input and Output Summary (last 24 hours): Intake/Output Summary (Last 24 hours) at 10/3/2023 1209  Last data filed at 10/3/2023 0941  Gross per 24 hour   Intake 743.75 ml   Output --   Net 743.75 ml       Physical Exam:   Physical Exam  Vitals and nursing note reviewed. Constitutional:       Appearance: He is normal weight. HENT:      Head: Normocephalic. Nose: Nose normal.      Mouth/Throat:      Mouth: Mucous membranes are moist.      Pharynx: Oropharynx is clear. Eyes:      General: No scleral icterus. Conjunctiva/sclera: Conjunctivae normal.      Pupils: Pupils are equal, round, and reactive to light. Cardiovascular:      Rate and Rhythm: Normal rate and regular rhythm. Heart sounds: No murmur heard. No friction rub. No gallop. Pulmonary:      Effort: Pulmonary effort is normal. No respiratory distress. Breath sounds: Normal breath sounds. No stridor. No wheezing, rhonchi or rales. Abdominal:      General: Abdomen is flat. Palpations: Abdomen is soft.    Musculoskeletal: General: Normal range of motion. Cervical back: Normal range of motion and neck supple. Right lower leg: No edema. Left lower leg: No edema. Lymphadenopathy:      Cervical: No cervical adenopathy. Skin:     General: Skin is warm. Coloration: Skin is not jaundiced or pale. Findings: No bruising, erythema or lesion. Neurological:      General: No focal deficit present. Mental Status: He is alert and oriented to person, place, and time. Mental status is at baseline. Cranial Nerves: No cranial nerve deficit. Motor: No weakness. Comments: Continues to have occasional garbled speech however improved compared to yesterday   Psychiatric:         Mood and Affect: Mood normal.         Behavior: Behavior normal.         Thought Content:  Thought content normal.          Additional Data:     Labs:  Results from last 7 days   Lab Units 10/03/23  0502 10/02/23  0430   WBC Thousand/uL 10.68* 6.73   HEMOGLOBIN g/dL 14.1 14.7   HEMATOCRIT % 41.7 43.5   PLATELETS Thousands/uL 201 216   NEUTROS PCT %  --  63   LYMPHS PCT %  --  21   MONOS PCT %  --  13*   EOS PCT %  --  2     Results from last 7 days   Lab Units 10/03/23  0502   SODIUM mmol/L 141   POTASSIUM mmol/L 3.7   CHLORIDE mmol/L 108   CO2 mmol/L 29   BUN mg/dL 23   CREATININE mg/dL 1.04   ANION GAP mmol/L 4   CALCIUM mg/dL 9.0   GLUCOSE RANDOM mg/dL 117     Results from last 7 days   Lab Units 10/01/23  2100   INR  0.98     Results from last 7 days   Lab Units 10/01/23  2043   POC GLUCOSE mg/dl 144*     Results from last 7 days   Lab Units 10/02/23  0430   HEMOGLOBIN A1C % 5.8*           Lines/Drains:  Invasive Devices     Peripheral Intravenous Line  Duration           Peripheral IV 10/01/23 Left Antecubital 1 day    Peripheral IV 10/01/23 Right;Ventral (anterior) Forearm 1 day                      Imaging: Reviewed radiology reports from this admission including: MRI brain    Recent Cultures (last 7 days):         Last 24 Hours Medication List:   Current Facility-Administered Medications   Medication Dose Route Frequency Provider Last Rate   • aspirin  81 mg Oral Daily [facility]     • aspirin  325 mg Oral Once [place], DO     • atorvastatin  40 mg Oral QPM [facility], DO     • clopidogrel  300 mg Oral Once [facility] Saraiya, DO     • dextrose 5% lactated ringer's  75 mL/hr Intravenous Continuous Elyse Marcano PA-C 75 mL/hr (10/03/23 0610)   • heparin (porcine)  5,000 Units Subcutaneous [facility] Saraiya, DO     • hydrALAZINE  5 mg Intravenous Q6H PRN Elyse Marcano PA-C     • levothyroxine  75 mcg Oral Early Morning [facility] Saraiya, DO     • pyridostigmine  30 mg Oral TID LUDWIN Silveira          Today, Patient Was Seen By: Elyse Marcano PA-C    **Please Note: This note may have been constructed using a voice recognition system. **

## 2023-10-04 LAB
ANION GAP SERPL CALCULATED.3IONS-SCNC: 5 MMOL/L
BUN SERPL-MCNC: 21 MG/DL (ref 5–25)
CALCIUM SERPL-MCNC: 9.2 MG/DL (ref 8.4–10.2)
CHLORIDE SERPL-SCNC: 106 MMOL/L (ref 96–108)
CO2 SERPL-SCNC: 28 MMOL/L (ref 21–32)
CREAT SERPL-MCNC: 0.9 MG/DL (ref 0.6–1.3)
ERYTHROCYTE [DISTWIDTH] IN BLOOD BY AUTOMATED COUNT: 13.2 % (ref 11.6–15.1)
GFR SERPL CREATININE-BSD FRML MDRD: 80 ML/MIN/1.73SQ M
GLUCOSE SERPL-MCNC: 116 MG/DL (ref 65–140)
HCT VFR BLD AUTO: 43.1 % (ref 36.5–49.3)
HGB BLD-MCNC: 15 G/DL (ref 12–17)
MCH RBC QN AUTO: 30.1 PG (ref 26.8–34.3)
MCHC RBC AUTO-ENTMCNC: 34.8 G/DL (ref 31.4–37.4)
MCV RBC AUTO: 87 FL (ref 82–98)
PLATELET # BLD AUTO: 213 THOUSANDS/UL (ref 149–390)
PMV BLD AUTO: 11.2 FL (ref 8.9–12.7)
POTASSIUM SERPL-SCNC: 3.3 MMOL/L (ref 3.5–5.3)
RBC # BLD AUTO: 4.98 MILLION/UL (ref 3.88–5.62)
SODIUM SERPL-SCNC: 139 MMOL/L (ref 135–147)
T4 FREE SERPL-MCNC: 1.03 NG/DL (ref 0.61–1.12)
WBC # BLD AUTO: 10.77 THOUSAND/UL (ref 4.31–10.16)

## 2023-10-04 PROCEDURE — C9113 INJ PANTOPRAZOLE SODIUM, VIA: HCPCS | Performed by: INTERNAL MEDICINE

## 2023-10-04 PROCEDURE — 92526 ORAL FUNCTION THERAPY: CPT

## 2023-10-04 PROCEDURE — 99232 SBSQ HOSP IP/OBS MODERATE 35: CPT | Performed by: PSYCHIATRY & NEUROLOGY

## 2023-10-04 PROCEDURE — 85027 COMPLETE CBC AUTOMATED: CPT

## 2023-10-04 PROCEDURE — 99232 SBSQ HOSP IP/OBS MODERATE 35: CPT

## 2023-10-04 PROCEDURE — 84439 ASSAY OF FREE THYROXINE: CPT

## 2023-10-04 PROCEDURE — 80048 BASIC METABOLIC PNL TOTAL CA: CPT

## 2023-10-04 RX ORDER — MAGNESIUM HYDROXIDE/ALUMINUM HYDROXICE/SIMETHICONE 120; 1200; 1200 MG/30ML; MG/30ML; MG/30ML
30 SUSPENSION ORAL EVERY 4 HOURS PRN
Status: DISCONTINUED | OUTPATIENT
Start: 2023-10-04 | End: 2023-10-07 | Stop reason: HOSPADM

## 2023-10-04 RX ORDER — PANTOPRAZOLE SODIUM 40 MG/10ML
40 INJECTION, POWDER, LYOPHILIZED, FOR SOLUTION INTRAVENOUS ONCE
Status: COMPLETED | OUTPATIENT
Start: 2023-10-04 | End: 2023-10-04

## 2023-10-04 RX ORDER — HYDRALAZINE HYDROCHLORIDE 20 MG/ML
5 INJECTION INTRAMUSCULAR; INTRAVENOUS ONCE
Status: COMPLETED | OUTPATIENT
Start: 2023-10-04 | End: 2023-10-04

## 2023-10-04 RX ADMIN — HEPARIN SODIUM 5000 UNITS: 5000 INJECTION INTRAVENOUS; SUBCUTANEOUS at 15:00

## 2023-10-04 RX ADMIN — DEXTROSE, SODIUM CHLORIDE, SODIUM LACTATE, POTASSIUM CHLORIDE, AND CALCIUM CHLORIDE 75 ML/HR: 5; .6; .31; .03; .02 INJECTION, SOLUTION INTRAVENOUS at 09:17

## 2023-10-04 RX ADMIN — DEXTROSE, SODIUM CHLORIDE, SODIUM LACTATE, POTASSIUM CHLORIDE, AND CALCIUM CHLORIDE 75 ML/HR: 5; .6; .31; .03; .02 INJECTION, SOLUTION INTRAVENOUS at 22:28

## 2023-10-04 RX ADMIN — HYDRALAZINE HYDROCHLORIDE 5 MG: 20 INJECTION INTRAMUSCULAR; INTRAVENOUS at 00:57

## 2023-10-04 RX ADMIN — HEPARIN SODIUM 5000 UNITS: 5000 INJECTION INTRAVENOUS; SUBCUTANEOUS at 05:58

## 2023-10-04 RX ADMIN — HEPARIN SODIUM 5000 UNITS: 5000 INJECTION INTRAVENOUS; SUBCUTANEOUS at 21:35

## 2023-10-04 RX ADMIN — PANTOPRAZOLE SODIUM 40 MG: 40 INJECTION, POWDER, FOR SOLUTION INTRAVENOUS at 18:34

## 2023-10-04 RX ADMIN — HYDRALAZINE HYDROCHLORIDE 5 MG: 20 INJECTION INTRAMUSCULAR; INTRAVENOUS at 03:29

## 2023-10-04 NOTE — ASSESSMENT & PLAN NOTE
Lab Results   Component Value Date    EGFR 80 10/04/2023    EGFR 67 10/03/2023    EGFR 60 10/02/2023    CREATININE 0.90 10/04/2023    CREATININE 1.04 10/03/2023    CREATININE 1.14 10/02/2023   Per chart review creatinine appears to be at baseline  Avoid nephrotoxic agents  Monitor creatinine with a.m.  BMP

## 2023-10-04 NOTE — PLAN OF CARE
NPO including meds  Consider ice chips PRN for comfort  Consider alt form of nutrition/meds (?Mestonin)  Will continue to follow closely, d/w MD, PA, CRNP

## 2023-10-04 NOTE — PLAN OF CARE
Problem: MOBILITY - ADULT  Goal: Maintain or return to baseline ADL function  Description: INTERVENTIONS:  -  Assess patient's ability to carry out ADLs; assess patient's baseline for ADL function and identify physical deficits which impact ability to perform ADLs (bathing, care of mouth/teeth, toileting, grooming, dressing, etc.)  - Assess/evaluate cause of self-care deficits   - Assess range of motion  - Assess patient's mobility; develop plan if impaired  - Assess patient's need for assistive devices and provide as appropriate  - Encourage maximum independence but intervene and supervise when necessary  - Involve family in performance of ADLs  - Assess for home care needs following discharge   - Consider OT consult to assist with ADL evaluation and planning for discharge  - Provide patient education as appropriate  Outcome: Progressing  Goal: Maintains/Returns to pre admission functional level  Description: INTERVENTIONS:  - Perform BMAT or MOVE assessment daily.   - Set and communicate daily mobility goal to care team and patient/family/caregiver. - Collaborate with rehabilitation services on mobility goals if consulted  - Perform Range of Motion    times a day. - Reposition patient every    hours.   - Dangle patient    times a day  - Stand patient      times a day  - Ambulate patient    times a day  - Out of bed to chair    times a day   - Out of bed for meals    times a day  - Out of bed for toileting  - Record patient progress and toleration of activity level   Outcome: Progressing     Problem: PAIN - ADULT  Goal: Verbalizes/displays adequate comfort level or baseline comfort level  Description: Interventions:  - Encourage patient to monitor pain and request assistance  - Assess pain using appropriate pain scale  - Administer analgesics based on type and severity of pain and evaluate response  - Implement non-pharmacological measures as appropriate and evaluate response  - Consider cultural and social influences on pain and pain management  - Notify physician/advanced practitioner if interventions unsuccessful or patient reports new pain  Outcome: Progressing     Problem: INFECTION - ADULT  Goal: Absence or prevention of progression during hospitalization  Description: INTERVENTIONS:  - Assess and monitor for signs and symptoms of infection  - Monitor lab/diagnostic results  - Monitor all insertion sites, i.e. indwelling lines, tubes, and drains  - Monitor endotracheal if appropriate and nasal secretions for changes in amount and color  - Hoven appropriate cooling/warming therapies per order  - Administer medications as ordered  - Instruct and encourage patient and family to use good hand hygiene technique  - Identify and instruct in appropriate isolation precautions for identified infection/condition  Outcome: Progressing  Goal: Absence of fever/infection during neutropenic period  Description: INTERVENTIONS:  - Monitor WBC    Outcome: Progressing     Problem: SAFETY ADULT  Goal: Maintain or return to baseline ADL function  Description: INTERVENTIONS:  -  Assess patient's ability to carry out ADLs; assess patient's baseline for ADL function and identify physical deficits which impact ability to perform ADLs (bathing, care of mouth/teeth, toileting, grooming, dressing, etc.)  - Assess/evaluate cause of self-care deficits   - Assess range of motion  - Assess patient's mobility; develop plan if impaired  - Assess patient's need for assistive devices and provide as appropriate  - Encourage maximum independence but intervene and supervise when necessary  - Involve family in performance of ADLs  - Assess for home care needs following discharge   - Consider OT consult to assist with ADL evaluation and planning for discharge  - Provide patient education as appropriate  Outcome: Progressing  Goal: Maintains/Returns to pre admission functional level  Description: INTERVENTIONS:  - Perform BMAT or MOVE assessment daily.   - Set and communicate daily mobility goal to care team and patient/family/caregiver. - Collaborate with rehabilitation services on mobility goals if consulted  - Perform Range of Motion    times a day. - Reposition patient every    hours.   - Dangle patient    times a day  - Stand patient      times a day  - Ambulate patient    times a day  - Out of bed to chair    times a day   - Out of bed for meals  times a day  - Out of bed for toileting  - Record patient progress and toleration of activity level   Outcome: Progressing  Goal: Patient will remain free of falls  Description: INTERVENTIONS:  -  Assess patient's ability to carry out ADLs; assess patient's baseline for ADL function and identify physical deficits which impact ability to perform ADLs (bathing, care of mouth/teeth, toileting, grooming, dressing, etc.)  - Assess/evaluate cause of self-care deficits   - Assess range of motion  - Assess patient's mobility; develop plan if impaired  - Assess patient's need for assistive devices and provide as appropriate  - Encourage maximum independence but intervene and supervise when necessary  - Involve family in performance of ADLs  - Assess for home care needs following discharge   - Consider OT consult to assist with ADL evaluation and planning for discharge  - Provide patient education as appropriate  Outcome: Progressing     Problem: DISCHARGE PLANNING  Goal: Discharge to home or other facility with appropriate resources  Description: INTERVENTIONS:  - Identify barriers to discharge w/patient and caregiver  - Arrange for needed discharge resources and transportation as appropriate  - Identify discharge learning needs (meds, wound care, etc.)  - Arrange for interpretive services to assist at discharge as needed  - Refer to Case Management Department for coordinating discharge planning if the patient needs post-hospital services based on physician/advanced practitioner order or complex needs related to functional status, cognitive ability, or social support system  Outcome: Progressing     Problem: Knowledge Deficit  Goal: Patient/family/caregiver demonstrates understanding of disease process, treatment plan, medications, and discharge instructions  Description: Complete learning assessment and assess knowledge base. Interventions:  - Provide teaching at level of understanding  - Provide teaching via preferred learning methods  Outcome: Progressing     Problem: Prexisting or High Potential for Compromised Skin Integrity  Goal: Skin integrity is maintained or improved  Description: INTERVENTIONS:  - Identify patients at risk for skin breakdown  - Assess and monitor skin integrity  - Assess and monitor nutrition and hydration status  - Monitor labs   - Assess for incontinence   - Turn and reposition patient  - Assist with mobility/ambulation  - Relieve pressure over bony prominences  - Avoid friction and shearing  - Provide appropriate hygiene as needed including keeping skin clean and dry  - Evaluate need for skin moisturizer/barrier cream  - Collaborate with interdisciplinary team   - Patient/family teaching  - Consider wound care consult   Outcome: Progressing     Problem: Nutrition/Hydration-ADULT  Goal: Nutrient/Hydration intake appropriate for improving, restoring or maintaining nutritional needs  Description: Monitor and assess patient's nutrition/hydration status for malnutrition. Collaborate with interdisciplinary team and initiate plan and interventions as ordered. Monitor patient's weight and dietary intake as ordered or per policy. Utilize nutrition screening tool and intervene as necessary. Determine patient's food preferences and provide high-protein, high-caloric foods as appropriate.      INTERVENTIONS:  - Monitor oral intake, urinary output, labs, and treatment plans  - Assess nutrition and hydration status and recommend course of action  - Evaluate amount of meals eaten  - Assist patient with eating if necessary   - Allow adequate time for meals  - Recommend/ encourage appropriate diets, oral nutritional supplements, and vitamin/mineral supplements  - Order, calculate, and assess calorie counts as needed  - Recommend, monitor, and adjust tube feedings and TPN/PPN based on assessed needs  - Assess need for intravenous fluids  - Provide specific nutrition/hydration education as appropriate  - Include patient/family/caregiver in decisions related to nutrition  Outcome: Progressing

## 2023-10-04 NOTE — ASSESSMENT & PLAN NOTE
· Dysphagia likely secondary to TIA, slurred speech improving today  · We will continue to have speech therapy evaluate, current recommendations n.p.o. with ice chips  · Continue with myasthenia gravis work-up  · We will defer NGT and diet supplementation at this time, may need to be reevaluated if n.p.o. status continues

## 2023-10-04 NOTE — PROGRESS NOTES
[address]  Progress Note  Name: Stormy De La Fuente  MRN: [id number]  Unit/Bed#: -01 I Date of Admission: 10/1/2023   Date of Service: 10/4/2023 I Hospital Day: 2    Assessment/Plan   * Stroke-like symptoms  Assessment & Plan  Stroke alert presenting with slurred speech, dysphagia and numbness near right eye. Symptoms improving. Has mildly slurred speech. Hx of TIA. · Stroke alert, neurology following. Appreciate recommendations. Recommended aspirin and Plavix load continuing with aspirin 81 mg daily followed by Plavix 75 mg daily  · Reports hx of stroke 4 years ago, not on asa or plavix currently.    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 with ice chips given dysphagia  · Neuro consulted, started myasthenia gravis work-up, however after further discussion believes etiology is likely TIA we will continue to evaluate for an additional 24 hours      Dysphagia  Assessment & Plan  · Dysphagia likely secondary to TIA, slurred speech improving today  · We will continue to have speech therapy evaluate, current recommendations n.p.o. with ice chips  · Continue with myasthenia gravis work-up  · We will defer NGT and diet supplementation at this time, may need to be reevaluated if n.p.o. status continues    HFrEF (heart failure with reduced ejection fraction) (HCC)  Assessment & Plan  Wt Readings from Last 3 Encounters:   10/02/23 98.4 kg (217 lb)       Per chart review has a history of heart failure with reduced EF on Lasix 20 mg daily  And appears euvolemic on exam  Currently not on Lasix, only on HCTZ  Intake and output  Monitor creatinine with a.m. BMP  We will need to continue to monitor respiratory status as we will start giving gentle IVF in the setting of n.p.o. status    Hypothyroidism  Assessment & Plan  TSH elevated, obtain free T4  Pt reports not currently on levothyroxine    History of CVA (cerebrovascular accident)  Assessment & Plan  Patient has a history of CVA, does not appear to be on aspirin or Plavix. Stroke pathway as above    Essential hypertension  Assessment & Plan  Pressure on arrival 180s, repeat 160s without intervention. Hold home dose Coreg, amlodipine, lisinopril and HCTZ, may restart medications however many which are oral, will give IV hydralazine for SBP greater than 160  Monitor vitals closely    CKD (chronic kidney disease)  Assessment & Plan  Lab Results   Component Value Date    EGFR 80 10/04/2023    EGFR 67 10/03/2023    EGFR 60 10/02/2023    CREATININE 0.90 10/04/2023    CREATININE 1.04 10/03/2023    CREATININE 1.14 10/02/2023   Per chart review creatinine appears to be at baseline  Avoid nephrotoxic agents  Monitor creatinine with a.m. BMP           VTE Pharmacologic Prophylaxis: VTE Score: 8 High Risk (Score >/= 5) - Pharmacological DVT Prophylaxis Ordered: heparin. Sequential Compression Devices Ordered. Patient Centered Rounds: I performed bedside rounds with nursing staff today. Discussions with Specialists or Other Care Team Provider: Cm, Neuro, speech    Education and Discussions with Family / Patient: Updated  (daughter) at bedside. Total Time Spent on Date of Encounter in care of patient: 35 mins. This time was spent on one or more of the following: performing physical exam; counseling and coordination of care; obtaining or reviewing history; documenting in the medical record; reviewing/ordering tests, medications or procedures; communicating with other healthcare professionals and discussing with patient's family/caregivers.     Current Length of Stay: 2 day(s)  Current Patient Status: Inpatient   Certification Statement: The patient will continue to require additional inpatient hospital stay due to Continue need for speech evaluation  Discharge Plan: Anticipate discharge in 24-48 hrs to home. Code Status: Level 1 - Full Code    Subjective:   Patient reports feeling better and talking better however still having some difficulty swallowing with choking episodes. He denies chest pain/pressure, rotations, numbness, nausea, shortness of breath, or chills. Objective:     Vitals:   Temp (24hrs), Av.9 °F (36.6 °C), Min:97.7 °F (36.5 °C), Max:98.2 °F (36.8 °C)    Temp:  [97.7 °F (36.5 °C)-98.2 °F (36.8 °C)] 98 °F (36.7 °C)  HR:  [47-86] 59  Resp:  [18-24] 18  BP: (136-189)/() 163/64  SpO2:  [92 %-97 %] 93 %  Body mass index is 32.05 kg/m². Input and Output Summary (last 24 hours): Intake/Output Summary (Last 24 hours) at 10/4/2023 1620  Last data filed at 10/4/2023 0916  Gross per 24 hour   Intake 1000 ml   Output 400 ml   Net 600 ml       Physical Exam:   Physical Exam  Vitals and nursing note reviewed. Constitutional:       Appearance: He is normal weight. HENT:      Head: Normocephalic. Nose: Nose normal.      Mouth/Throat:      Mouth: Mucous membranes are moist.      Pharynx: Oropharynx is clear. Eyes:      General: No scleral icterus. Conjunctiva/sclera: Conjunctivae normal.      Pupils: Pupils are equal, round, and reactive to light. Cardiovascular:      Rate and Rhythm: Normal rate and regular rhythm. Heart sounds: No murmur heard. No friction rub. No gallop. Pulmonary:      Effort: Pulmonary effort is normal. No respiratory distress. Breath sounds: Normal breath sounds. No stridor. No wheezing, rhonchi or rales. Abdominal:      General: Abdomen is flat. Palpations: Abdomen is soft. Musculoskeletal:         General: Normal range of motion. Cervical back: Normal range of motion and neck supple. Right lower leg: No edema. Left lower leg: No edema.    Lymphadenopathy:      Cervical: No cervical adenopathy. Skin:     General: Skin is warm. Coloration: Skin is not jaundiced or pale. Findings: No bruising, erythema or lesion. Neurological:      General: No focal deficit present. Mental Status: He is alert and oriented to person, place, and time. Mental status is at baseline. Cranial Nerves: No cranial nerve deficit. Motor: No weakness. Psychiatric:         Mood and Affect: Mood normal.         Behavior: Behavior normal.         Thought Content: Thought content normal.          Additional Data:     Labs:  Results from last 7 days   Lab Units 10/04/23  0436 10/03/23  0502 10/02/23  0430   WBC Thousand/uL 10.77*   < > 6.73   HEMOGLOBIN g/dL 15.0   < > 14.7   HEMATOCRIT % 43.1   < > 43.5   PLATELETS Thousands/uL 213   < > 216   NEUTROS PCT %  --   --  63   LYMPHS PCT %  --   --  21   MONOS PCT %  --   --  13*   EOS PCT %  --   --  2    < > = values in this interval not displayed.      Results from last 7 days   Lab Units 10/04/23  0436   SODIUM mmol/L 139   POTASSIUM mmol/L 3.3*   CHLORIDE mmol/L 106   CO2 mmol/L 28   BUN mg/dL 21   CREATININE mg/dL 0.90   ANION GAP mmol/L 5   CALCIUM mg/dL 9.2   GLUCOSE RANDOM mg/dL 116     Results from last 7 days   Lab Units 10/01/23  2100   INR  0.98     Results from last 7 days   Lab Units 10/01/23  2043   POC GLUCOSE mg/dl 144*     Results from last 7 days   Lab Units 10/02/23  0430   HEMOGLOBIN A1C % 5.8*           Lines/Drains:  Invasive Devices     Peripheral Intravenous Line  Duration           Peripheral IV 10/01/23 Left Antecubital 2 days    Peripheral IV 10/01/23 Right;Ventral (anterior) Forearm 2 days                      Imaging: Reviewed radiology reports from this admission including: MRI brain    Recent Cultures (last 7 days):         Last 24 Hours Medication List:   Current Facility-Administered Medications   Medication Dose Route Frequency Provider Last Rate   • aspirin  81 mg Oral Daily Yulia Miller DO     • aspirin  325 mg Oral Once [place], DO     • atorvastatin  40 mg Oral QPM Yulia Galina Saraiya, DO     • clopidogrel  300 mg Oral Once Yulia [facility] Saraiya, DO     • dextrose 5% lactated ringer's  75 mL/hr Intravenous Continuous Amedeo Sandhoff Prator, PA-C 75 mL/hr (10/04/23 7245)   • heparin (porcine)  5,000 Units Subcutaneous [facility] Yulia Galina Saraiya, DO     • hydrALAZINE  5 mg Intravenous Q6H PRN Amedeo Sandhoff Prator, PA-C     • levothyroxine  75 mcg Oral Early Morning Yulia Galina Saraiya, DO     • pyridostigmine  30 mg Oral TID LUDWIN Hernandez          Today, Patient Was Seen By: Amedeo Sandhoff Prator, PA-C    **Please Note: This note may have been constructed using a voice recognition system. **

## 2023-10-04 NOTE — ASSESSMENT & PLAN NOTE
Stroke alert presenting with slurred speech, dysphagia and numbness near right eye. Symptoms improving. Has mildly slurred speech. Hx of TIA. · Stroke alert, neurology following. Appreciate recommendations. Recommended aspirin and Plavix load continuing with aspirin 81 mg daily followed by Plavix 75 mg daily  · Reports hx of stroke 4 years ago, not on asa or plavix currently.    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 with ice chips given dysphagia  · Neuro consulted, started myasthenia gravis work-up, however after further discussion believes etiology is likely TIA we will continue to evaluate for an additional 24 hours

## 2023-10-04 NOTE — SPEECH THERAPY NOTE
Speech Language/Pathology     Speech/Language Pathology Progress Note     Patient Name: Taylor Posey    FWAFV'C Date: 10/4/2023     Problem List  Principal Problem:    Stroke-like symptoms  Active Problems:    CKD (chronic kidney disease)    Essential hypertension    History of CVA (cerebrovascular accident)    Hypothyroidism    Hyperlipidemia    HFrEF (heart failure with reduced ejection fraction) ([address])    Dysphagia      Subjective:  Patient received awake and alert, daughter present and provides additional info re: baseline status. Previous/current diet: NPO    Objective: The following consistencies were tested <1/2 teaspoon quantity of: ice chips, thin liquid, nectar thick. Laryngeal ise noted upon palpation, multiple swallows (<6) noted with very small quantities of thin and nectar thick liquid followed by overt cough. 2-3 swallows with ice chips; no overt s/s of aspiration. Progressively strained vocal quality. Secretion mgmt improved from prev encounters. Assessment:  Oropharyngeal swallow function appears slightly improved w/ some improvement in secretion mgmt. However cannot r/o pharyngeal retention (thick viscosities>thin) and post swallow airway invasion of same. While some improvement is suspected, pt remains unlikely safety maintain nutrition via oral intake w/o potential aspiration at this time.   Not yet at baseline status     Plan:  NPO including meds  Consider ice chips PRN for comfort  Consider alt form of nutrition/meds (?[place])  Will continue to follow closely, d/w MD, PA, LUDWIN Penny, [address] [facility], [address] [facility]  Speech-Language Pathologist  PA #[id number]  [place] #[id number]

## 2023-10-04 NOTE — CASE MANAGEMENT
Case Management Progress Note    Patient name Juliana Powell  Location /-44 MRN [id number]  : 1945 Date 10/4/2023       LOS (days): 2  Geometric Mean LOS (GMLOS) (days):   Days to GMLOS:        OBJECTIVE:  Current admission status: Inpatient  Preferred Pharmacy: No Pharmacies Listed  Primary Care Provider: No primary care provider on file. Primary Insurance: [facility]  Secondary Insurance: MEDICARE MISC REPLACEMENT    PROGRESS NOTE:  Patient reviewed during Interdisciplinary Rounds with BATSHEVA today. Anticipated d/c is TBD. Patient progressed to ice chips today. Will continue to follow for progress and d/c planning. PT/OT rec remains home w/ OP therapy.

## 2023-10-04 NOTE — PROGRESS NOTES
Progress Note - Neurology   Milly Lav 77 y.o. male [id number]  Unit/Bed#: /-01    Assessment/Plan:    * Stroke-like symptoms  Assessment & Plan   77 y.o.  male with prior stroke 2019, HTN, HLD, HFrEF, hypothyroid, and CKD who presented to [facility] 10/1/23 with sudden onset slurred speech, difficulty swallowing and a small area of numbness around his right eye. Per previous neurology provider, in the ED, patient and family reported that the numbness resolved and dysarthria/dysphagia were improving. Stroke alert initiated. NIHSS 1 for mild dysarthria. /84, . CTH/CTA notable for right V3 and V4 occlusion (also noted on CTA report 2019). 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 He was not a candidate for TNK as his symptoms were improving with low NIH. Per chart review, pt was recommended to be loaded with  mg and Plavix 300 mg but unfortunately failed swallow evaluation. Later that evening, pt had significant worsening of his dysphagia per chart review. Pt was made NPO, hence only received  mg TX. Repeat CTH stable. Neurodiagnostics   - 10/1/23 CTH/CTA:   "1.  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 Mild stenosis at both terminal ICAs"  - 10/1/23 Repeat CTH:  "No acute intracranial abnormality."  - 10/2/23 MRI brain without contrast:  "1. No acute infarction, edema, or mass effect. 2. Mild chronic microangiopathic ischemic changes. "  - Echo 10/2/23:   "Echocardiogram reviewed, EF 50%, aortic valve sclerosis, atria size normal B/L, limited bubble study negative but probably nondiagnostic due to technical difficulties. "  - Labs   - Lipid panel: total cholesterol 160, TG 81, HDL 41,   - TSH 5.2, free T4 1.03  - A1c 5.8    Etiology of symptoms is unclear, differential includes MRI negative stroke vs myasthenia gravis. Plan  • Attending neurologist discussed recommendation for NG tube placement today for nutrition and medications. Pt and pt's daughter do not want to proceed with NG tube placement at this time. Pt and pt's daughter Would like reevalutation tomorrow. Pt and pt's daughter voiced understanding of risks/benefits. o When NG tube placed or when pt cleared for PO medications, recommend starting mestinon 30 mg TID   • Consider  mg MS until pt cleared for PO medications   • Atorvastatin 40 mg daily when able   • Goal normotension. • Euglycemic, normothermic goal  • Continue telemetry  • PT/OT/ST  • Stroke education  • Ach receptor binding/blocking/modualting pending. MUSK pending   • Continue to monitor and notify neurology with any changes. - Medical management andcorrection of any metabolic or infectious disturbances per primary service. Hyperlipidemia  Assessment & Plan  - Continue atorvastatin 40mg daily once able to take PO.  - LDL goal <70. Hypothyroidism  Assessment & Plan  - TSH 5.2; recommend checking T4    History of CVA (cerebrovascular accident)  Assessment & Plan  - In regard to his prior stroke, he was evaluated at [facility] for dysphagia, slurred speech and intermittent vertigo. MRI brain 6/27/2019 revealed:   "Tiny acute small vessel infarctions in the right lateral lower brainstem medulla and adjacent right lower cerebellar vermis. 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 Mild focal plaquing and estimated 50% stenoses bilateral cavernous ICA. "  - Consider  mg MS while NPO.     Essential hypertension  Assessment & Plan  - Goal normotension, avoid hypotension.   - Management as per primary team.      Recommendations for outpatient neurological follow up have yet to be determined. **History and physical examination obtained by attending neurologist. AP in room as witness to history and physical examination obtained and acted solely as a scribe for attending neurologist. This AP did not provide any decision making for this encounter. **    Subjective:     Patient denies twitching in muscles. Patient denies fasciculation muscles. Patient reports being able to ambulate to the bathroom on his own. Patient denies weakness. Patient reports having a few falls at home but none lately. Patient reports having lightheadedness when in the restroom brushing his teeth in the past.  Patient reports this was months ago. Patient denies trouble with his vision other than when reading. Patient reports his fraternal twin brother had Parkinson's disease and had since passed away. Patient's daughter reports patient's speech is a little unclear after working with speech therapy today. Patient's daughter reports patient's speech improved yesterday and continued to be improved throughout the day yesterday. Speech therapy reports patient still requires 4-6 swallows to completely swallow nectar thick liquids. Speech therapy reports patient is not cleared for diet at this time. Speech therapy did clear patient for ice chips today. Past Medical History:   Diagnosis Date   • Hypertension    • TIA (transient ischemic attack)      History reviewed. No pertinent surgical history. History reviewed. No pertinent family history.   Social History     Socioeconomic History   • Marital status: Single     Spouse name: None   • Number of children: None   • Years of education: None   • Highest education level: None   Occupational History   • None   Tobacco Use   • Smoking status: Never   • Smokeless tobacco: Never   Vaping Use   • Vaping Use: Never used   Substance and Sexual Activity   • Alcohol use: Never   • Drug use: Never   • Sexual activity: None   Other Topics Concern   • None   Social History Narrative   • None     Social Determinants of Health     Financial Resource Strain: Not on file   Food Insecurity: No Food Insecurity (10/2/2023)    Hunger Vital Sign    • Worried About Running Out of Food in the Last Year: Never true    • Ran Out of Food in the Last Year: Never true   Transportation Needs: No Transportation Needs (10/2/2023)    PRAPARE - Transportation    • Lack of Transportation (Medical): No    • Lack of Transportation (Non-Medical): No   Physical Activity: Not on file   Stress: Not on file   Social Connections: Not on file   Intimate Partner Violence: Not on file   Housing Stability: Low Risk  (10/2/2023)    Housing Stability Vital Sign    • Unable to Pay for Housing in the Last Year: No    • Number of Places Lived in the Last Year: 2    • Unstable Housing in the Last Year: No     E-Cigarette/Vaping   • E-Cigarette Use Never User      E-Cigarette/Vaping Substances         Medications: All current active meds have been reviewed and current meds:  Scheduled Meds:  Current Facility-Administered Medications   Medication Dose Route Frequency Provider Last Rate   • aspirin  81 mg Oral Daily [facility], [facility]     • aspirin  325 mg Oral Once [place], DO     • atorvastatin  40 mg Oral QPM [facility], [facility]     • clopidogrel  300 mg Oral Once [facility] Paul, DO     • dextrose 5% lactated ringer's  75 mL/hr Intravenous Continuous Ran Marcano PA-C 75 mL/hr (10/04/23 1069)   • heparin (porcine)  5,000 Units Subcutaneous [facility]     • hydrALAZINE  5 mg Intravenous Q6H PRN Ran Marcano PA-C     • levothyroxine  75 mcg Oral Early Morning [facility], DO     • pyridostigmine  30 mg Oral TID Marylu Doherty, CORALNP       Continuous Infusions:dextrose 5% lactated ringer's, 75 mL/hr, Last Rate: 75 mL/hr (10/04/23 0917)      PRN Meds:.•  hydrALAZINE       ROS:   Review of Systems   Neurological: Negative for weakness and numbness. Vitals:   /63 (BP Location: Left arm)   Pulse (!) 50   Temp 97.7 °F (36.5 °C) (Oral)   Resp 18   Ht 5' 9" (1.753 m)   Wt 98.4 kg (217 lb)   SpO2 95%   BMI 32.05 kg/m²     Physical Exam:   Physical Exam  Vitals and nursing note reviewed. Exam conducted with a chaperone present (pt's daughter). HENT:      Head: Normocephalic and atraumatic. Mouth/Throat:      Mouth: Mucous membranes are dry. Eyes:      General: No scleral icterus. Right eye: No discharge. Left eye: No discharge. Extraocular Movements: Extraocular movements intact. Conjunctiva/sclera: Conjunctivae normal.   Cardiovascular:      Rate and Rhythm: Bradycardia present. Pulmonary:      Effort: Pulmonary effort is normal.   Neurological:      Mental Status: He is alert. Psychiatric:      Comments: Pleasant and cooperative during exam        Neurologic Exam     Mental Status   Speech: (Speech fluent. Speech content appropriate. No aphasia appreciated.)  Level of consciousness: alert    Cranial Nerves     CN VIII   Hearing impaired: Grossly intact. - Palate excursion improved on right     Motor Exam   -Neck flexion and extension 5/5             Labs: I have personally reviewed pertinent reports.    Recent Results (from the past 24 hour(s))   T4, free    Collection Time: 10/04/23  4:36 AM   Result Value Ref Range    Free T4 1.03 0.61 - 1.12 ng/dL   Basic metabolic panel    Collection Time: 10/04/23  4:36 AM   Result Value Ref Range    Sodium 139 135 - 147 mmol/L    Potassium 3.3 (L) 3.5 - 5.3 mmol/L    Chloride 106 96 - 108 mmol/L    CO2 28 21 - 32 mmol/L    ANION GAP 5 mmol/L    BUN 21 5 - 25 mg/dL    Creatinine 0.90 0.60 - 1.30 mg/dL    Glucose 116 65 - 140 mg/dL    Calcium 9.2 8.4 - 10.2 mg/dL    eGFR 80 ml/min/1.73sq m   CBC    Collection Time: 10/04/23  4:36 AM   Result Value Ref Range    WBC 10.77 (H) 4.31 - 10.16 Thousand/uL    RBC 4.98 3.88 - 5.62 Million/uL    Hemoglobin 15.0 12.0 - 17.0 g/dL    Hematocrit 43.1 36.5 - 49.3 %    MCV 87 82 - 98 fL    MCH 30.1 26.8 - 34.3 pg    MCHC 34.8 31.4 - 37.4 g/dL    RDW 13.2 11.6 - 15.1 %    Platelets 523 378 - 211 Thousands/uL    MPV 11.2 8.9 - 12.7 fL       Imaging: I have personally reviewed pertinent imaging in PACS, including MRI brain without contrast 10/2/2023, CT head without contrast 10/1/2023, CTA head and neck with and without contrast 10/1/2023, CT head without contrast 10/1/2023,  and I have personally reviewed PACS reports. EKG, Pathology, and Other Studies: I have personally reviewed pertinent reports. VTE Prophylaxis: Sequential compression device (Venodyne)       Counseling / Coordination of Care  Attending neurologist spent a total time of 35 minutes on 10/04/23 in caring for this patient including Diagnostic results, Prognosis, Risks and benefits of tx options, Instructions for management, Patient and family education, Importance of tx compliance, Risk factor reductions, Impressions, Counseling / Coordination of care, Documenting in the medical record, Reviewing / ordering tests, medicine, procedures  , Obtaining or reviewing history   and Communicating with other healthcare professionals . This note was completed in part utilizing [address]. Grammatical errors, random word insertions, spelling mistakes, and incomplete sentences may be an occasional consequence of this system secondary to software limitations, ambient noise, and hardware issues. If you have any questions or concerns about the content, text, or information contained within the body of this dictation, please contact the provider for clarification.

## 2023-10-04 NOTE — PLAN OF CARE
Problem: MOBILITY - ADULT  Goal: Maintain or return to baseline ADL function  Description: INTERVENTIONS:  -  Assess patient's ability to carry out ADLs; assess patient's baseline for ADL function and identify physical deficits which impact ability to perform ADLs (bathing, care of mouth/teeth, toileting, grooming, dressing, etc.)  - Assess/evaluate cause of self-care deficits   - Assess range of motion  - Assess patient's mobility; develop plan if impaired  - Assess patient's need for assistive devices and provide as appropriate  - Encourage maximum independence but intervene and supervise when necessary  - Involve family in performance of ADLs  - Assess for home care needs following discharge   - Consider OT consult to assist with ADL evaluation and planning for discharge  - Provide patient education as appropriate  Outcome: Progressing  Goal: Maintains/Returns to pre admission functional level  Description: INTERVENTIONS:  - Perform BMAT or MOVE assessment daily.   - Set and communicate daily mobility goal to care team and patient/family/caregiver. - Collaborate with rehabilitation services on mobility goals if consulted  - Perform Range of Motion 4 times a day. - Reposition patient every 4 hours.   - Dangle patient 4 times a day  - Stand patient 4 times a day  - Ambulate patient 4 times a day  - Out of bed to chair 4 times a day   - Out of bed for meals 3 times a day  - Out of bed for toileting  - Record patient progress and toleration of activity level   Outcome: Progressing     Problem: PAIN - ADULT  Goal: Verbalizes/displays adequate comfort level or baseline comfort level  Description: Interventions:  - Encourage patient to monitor pain and request assistance  - Assess pain using appropriate pain scale  - Administer analgesics based on type and severity of pain and evaluate response  - Implement non-pharmacological measures as appropriate and evaluate response  - Consider cultural and social influences on pain and pain management  - Notify physician/advanced practitioner if interventions unsuccessful or patient reports new pain  Outcome: Progressing     Problem: INFECTION - ADULT  Goal: Absence or prevention of progression during hospitalization  Description: INTERVENTIONS:  - Assess and monitor for signs and symptoms of infection  - Monitor lab/diagnostic results  - Monitor all insertion sites, i.e. indwelling lines, tubes, and drains  - Monitor endotracheal if appropriate and nasal secretions for changes in amount and color  - [place] appropriate cooling/warming therapies per order  - Administer medications as ordered  - Instruct and encourage patient and family to use good hand hygiene technique  - Identify and instruct in appropriate isolation precautions for identified infection/condition  Outcome: Progressing  Goal: Absence of fever/infection during neutropenic period  Description: INTERVENTIONS:  - Monitor WBC    Outcome: Progressing     Problem: SAFETY ADULT  Goal: Maintain or return to baseline ADL function  Description: INTERVENTIONS:  -  Assess patient's ability to carry out ADLs; assess patient's baseline for ADL function and identify physical deficits which impact ability to perform ADLs (bathing, care of mouth/teeth, toileting, grooming, dressing, etc.)  - Assess/evaluate cause of self-care deficits   - Assess range of motion  - Assess patient's mobility; develop plan if impaired  - Assess patient's need for assistive devices and provide as appropriate  - Encourage maximum independence but intervene and supervise when necessary  - Involve family in performance of ADLs  - Assess for home care needs following discharge   - Consider OT consult to assist with ADL evaluation and planning for discharge  - Provide patient education as appropriate  Outcome: Progressing  Goal: Maintains/Returns to pre admission functional level  Description: INTERVENTIONS:  - Perform BMAT or MOVE assessment daily.   - Set and communicate daily mobility goal to care team and patient/family/caregiver. - Collaborate with rehabilitation services on mobility goals if consulted  - Perform Range of Motion 4 times a day. - Reposition patient every 4 hours.   - Dangle patient 4 times a day  - Stand patient 4 times a day  - Ambulate patient 4 times a day  - Out of bed to chair 4 times a day   - Out of bed for meals 3 times a day  - Out of bed for toileting  - Record patient progress and toleration of activity level   Outcome: Progressing  Goal: Patient will remain free of falls  Description: INTERVENTIONS:  - Educate patient/family on patient safety including physical limitations  - Instruct patient to call for assistance with activity   - Consult OT/PT to assist with strengthening/mobility   - Keep Call bell within reach  - Keep bed low and locked with side rails adjusted as appropriate  - Keep care items and personal belongings within reach  - Initiate and maintain comfort rounds  - Make Fall Risk Sign visible to staff  - Offer Toileting every 4 Hours, in advance of need  - Initiate/Maintain bed alarm  - Obtain necessary fall risk management equipment:   - Apply yellow socks and bracelet for high fall risk patients  - Consider moving patient to room near nurses station  Outcome: Progressing     Problem: DISCHARGE PLANNING  Goal: Discharge to home or other facility with appropriate resources  Description: INTERVENTIONS:  - Identify barriers to discharge w/patient and caregiver  - Arrange for needed discharge resources and transportation as appropriate  - Identify discharge learning needs (meds, wound care, etc.)  - Arrange for interpretive services to assist at discharge as needed  - Refer to Case Management Department for coordinating discharge planning if the patient needs post-hospital services based on physician/advanced practitioner order or complex needs related to functional status, cognitive ability, or social support system  Outcome: Progressing     Problem: Knowledge Deficit  Goal: Patient/family/caregiver demonstrates understanding of disease process, treatment plan, medications, and discharge instructions  Description: Complete learning assessment and assess knowledge base. Interventions:  - Provide teaching at level of understanding  - Provide teaching via preferred learning methods  Outcome: Progressing     Problem: Prexisting or High Potential for Compromised Skin Integrity  Goal: Skin integrity is maintained or improved  Description: INTERVENTIONS:  - Identify patients at risk for skin breakdown  - Assess and monitor skin integrity  - Assess and monitor nutrition and hydration status  - Monitor labs   - Assess for incontinence   - Turn and reposition patient  - Assist with mobility/ambulation  - Relieve pressure over bony prominences  - Avoid friction and shearing  - Provide appropriate hygiene as needed including keeping skin clean and dry  - Evaluate need for skin moisturizer/barrier cream  - Collaborate with interdisciplinary team   - Patient/family teaching  - Consider wound care consult   Outcome: Progressing     Problem: Nutrition/Hydration-ADULT  Goal: Nutrient/Hydration intake appropriate for improving, restoring or maintaining nutritional needs  Description: Monitor and assess patient's nutrition/hydration status for malnutrition. Collaborate with interdisciplinary team and initiate plan and interventions as ordered. Monitor patient's weight and dietary intake as ordered or per policy. Utilize nutrition screening tool and intervene as necessary. Determine patient's food preferences and provide high-protein, high-caloric foods as appropriate.      INTERVENTIONS:  - Monitor oral intake, urinary output, labs, and treatment plans  - Assess nutrition and hydration status and recommend course of action  - Evaluate amount of meals eaten  - Assist patient with eating if necessary   - Allow adequate time for meals  - Recommend/ encourage appropriate diets, oral nutritional supplements, and vitamin/mineral supplements  - Order, calculate, and assess calorie counts as needed  - Recommend, monitor, and adjust tube feedings and TPN/PPN based on assessed needs  - Assess need for intravenous fluids  - Provide specific nutrition/hydration education as appropriate  - Include patient/family/caregiver in decisions related to nutrition  Outcome: Progressing

## 2023-10-05 LAB
ACHR BIND AB SER-SCNC: 0.04 NMOL/L (ref 0–0.24)
ANION GAP SERPL CALCULATED.3IONS-SCNC: 8 MMOL/L
BUN SERPL-MCNC: 21 MG/DL (ref 5–25)
CALCIUM SERPL-MCNC: 9.1 MG/DL (ref 8.4–10.2)
CHLORIDE SERPL-SCNC: 108 MMOL/L (ref 96–108)
CO2 SERPL-SCNC: 24 MMOL/L (ref 21–32)
CREAT SERPL-MCNC: 0.84 MG/DL (ref 0.6–1.3)
ERYTHROCYTE [DISTWIDTH] IN BLOOD BY AUTOMATED COUNT: 13 % (ref 11.6–15.1)
GFR SERPL CREATININE-BSD FRML MDRD: 83 ML/MIN/1.73SQ M
GLUCOSE SERPL-MCNC: 109 MG/DL (ref 65–140)
HCT VFR BLD AUTO: 46.5 % (ref 36.5–49.3)
HGB BLD-MCNC: 15.7 G/DL (ref 12–17)
MCH RBC QN AUTO: 29.6 PG (ref 26.8–34.3)
MCHC RBC AUTO-ENTMCNC: 33.8 G/DL (ref 31.4–37.4)
MCV RBC AUTO: 88 FL (ref 82–98)
PLATELET # BLD AUTO: 226 THOUSANDS/UL (ref 149–390)
PMV BLD AUTO: 10.5 FL (ref 8.9–12.7)
POTASSIUM SERPL-SCNC: 3.3 MMOL/L (ref 3.5–5.3)
RBC # BLD AUTO: 5.3 MILLION/UL (ref 3.88–5.62)
SODIUM SERPL-SCNC: 140 MMOL/L (ref 135–147)
WBC # BLD AUTO: 11.06 THOUSAND/UL (ref 4.31–10.16)

## 2023-10-05 PROCEDURE — 92526 ORAL FUNCTION THERAPY: CPT

## 2023-10-05 PROCEDURE — 80048 BASIC METABOLIC PNL TOTAL CA: CPT

## 2023-10-05 PROCEDURE — 99232 SBSQ HOSP IP/OBS MODERATE 35: CPT

## 2023-10-05 PROCEDURE — 99232 SBSQ HOSP IP/OBS MODERATE 35: CPT | Performed by: PSYCHIATRY & NEUROLOGY

## 2023-10-05 PROCEDURE — C9113 INJ PANTOPRAZOLE SODIUM, VIA: HCPCS

## 2023-10-05 PROCEDURE — 85027 COMPLETE CBC AUTOMATED: CPT

## 2023-10-05 RX ORDER — PANTOPRAZOLE SODIUM 40 MG/10ML
40 INJECTION, POWDER, LYOPHILIZED, FOR SOLUTION INTRAVENOUS
Status: DISCONTINUED | OUTPATIENT
Start: 2023-10-05 | End: 2023-10-07 | Stop reason: HOSPADM

## 2023-10-05 RX ORDER — BISACODYL 10 MG
10 SUPPOSITORY, RECTAL RECTAL DAILY PRN
Status: DISCONTINUED | OUTPATIENT
Start: 2023-10-05 | End: 2023-10-07 | Stop reason: HOSPADM

## 2023-10-05 RX ORDER — PYRIDOSTIGMINE BROMIDE 60 MG/1
30 TABLET ORAL ONCE
Status: COMPLETED | OUTPATIENT
Start: 2023-10-05 | End: 2023-10-05

## 2023-10-05 RX ORDER — PYRIDOSTIGMINE BROMIDE 60 MG/1
30 TABLET ORAL 3 TIMES DAILY
Status: DISCONTINUED | OUTPATIENT
Start: 2023-10-05 | End: 2023-10-06

## 2023-10-05 RX ORDER — CARVEDILOL 3.12 MG/1
3.12 TABLET ORAL 2 TIMES DAILY WITH MEALS
Status: DISCONTINUED | OUTPATIENT
Start: 2023-10-05 | End: 2023-10-07 | Stop reason: HOSPADM

## 2023-10-05 RX ORDER — AMLODIPINE BESYLATE 5 MG/1
5 TABLET ORAL DAILY
Status: DISCONTINUED | OUTPATIENT
Start: 2023-10-05 | End: 2023-10-07 | Stop reason: HOSPADM

## 2023-10-05 RX ADMIN — PYRIDOSTIGMINE BROMIDE 30 MG: 60 TABLET ORAL at 09:59

## 2023-10-05 RX ADMIN — PANTOPRAZOLE SODIUM 40 MG: 40 INJECTION, POWDER, FOR SOLUTION INTRAVENOUS at 09:58

## 2023-10-05 RX ADMIN — ASPIRIN 81 MG: 81 TABLET, CHEWABLE ORAL at 09:58

## 2023-10-05 RX ADMIN — DEXTROSE, SODIUM CHLORIDE, SODIUM LACTATE, POTASSIUM CHLORIDE, AND CALCIUM CHLORIDE 75 ML/HR: 5; .6; .31; .03; .02 INJECTION, SOLUTION INTRAVENOUS at 14:27

## 2023-10-05 RX ADMIN — BISACODYL 10 MG: 10 SUPPOSITORY RECTAL at 11:06

## 2023-10-05 RX ADMIN — PYRIDOSTIGMINE BROMIDE 30 MG: 60 TABLET ORAL at 17:55

## 2023-10-05 RX ADMIN — PYRIDOSTIGMINE BROMIDE 30 MG: 60 TABLET ORAL at 14:27

## 2023-10-05 RX ADMIN — CARVEDILOL 3.12 MG: 3.12 TABLET, FILM COATED ORAL at 17:55

## 2023-10-05 RX ADMIN — HEPARIN SODIUM 5000 UNITS: 5000 INJECTION INTRAVENOUS; SUBCUTANEOUS at 21:17

## 2023-10-05 RX ADMIN — HEPARIN SODIUM 5000 UNITS: 5000 INJECTION INTRAVENOUS; SUBCUTANEOUS at 14:27

## 2023-10-05 RX ADMIN — AMLODIPINE BESYLATE 5 MG: 5 TABLET ORAL at 09:58

## 2023-10-05 RX ADMIN — ATORVASTATIN CALCIUM 40 MG: 40 TABLET, FILM COATED ORAL at 17:55

## 2023-10-05 RX ADMIN — HEPARIN SODIUM 5000 UNITS: 5000 INJECTION INTRAVENOUS; SUBCUTANEOUS at 05:19

## 2023-10-05 RX ADMIN — CARVEDILOL 3.12 MG: 3.12 TABLET, FILM COATED ORAL at 09:58

## 2023-10-05 NOTE — ASSESSMENT & PLAN NOTE
Wt Readings from Last 3 Encounters:   10/02/23 98.4 kg (217 lb)       Per chart review has a history of heart failure with reduced EF on Lasix 20 mg daily  And appears euvolemic on exam  Patient reports not currently taking Lasix  Intake and output  Monitor creatinine with a.m. BMP  We will need to continue to monitor respiratory status as we will continue giving gentle IVF in the setting of poor p.o. intake

## 2023-10-05 NOTE — PROGRESS NOTES
[address]  Progress Note  Name: Shaheen Blanco  MRN: [id number]  Unit/Bed#: -01 I Date of Admission: 10/1/2023   Date of Service: 10/5/2023 I Hospital Day: 3    Assessment/Plan   * Stroke-like symptoms  Assessment & Plan  Stroke alert presenting with slurred speech, dysphagia and numbness near right eye. Symptoms improving. Has mildly slurred speech. Hx of TIA. · Stroke alert, neurology following. Appreciate recommendations. Recommended aspirin and Plavix load continuing with aspirin 81 mg daily followed by Plavix 75 mg daily  · Reports hx of stroke 4 years ago, not on asa or plavix currently.    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 as described, this could represent acute or chronic occlusion  · MRI brain-no acute ischemic event  · May attempt to gradually achieve normotensive BP  · Speech recommending starting puréed diet and may take pills today, however they acknowledge that patient can only take between 3 and 7 spoonfuls before needing a rest, will have swallow study tomorrow  · Neuro consulted-pending myasthenia gravis blood work, started Mestinon today, will continue to trial and see how patient does, currently believes TIA versus myasthenia gravis      Dysphagia  Assessment & Plan  · Dysphagia likely secondary to TIA, slurred speech improving today  · We will continue to have speech therapy evaluate, current recommendations puréed diet with nectar thick liquids, will have barium swallow study tomorrow  · Continue with myasthenia gravis work-up  · We will defer NGT and diet supplementation at this time, may need to be reevaluated if n.p.o. status continues    HFrEF (heart failure with reduced ejection fraction) (HCC)  Assessment & Plan  Wt Readings from Last 3 Encounters:   10/02/23 98.4 kg (217 lb)       Per chart review has a history of heart failure with reduced EF on Lasix 20 mg daily  And appears euvolemic on exam  Patient reports not currently taking Lasix  Intake and output  Monitor creatinine with a.m. BMP  We will need to continue to monitor respiratory status as we will continue giving gentle IVF in the setting of poor p.o. intake    Hyperlipidemia  Assessment & Plan  Lipid panel showed total cholesterol 160, triglyceride 81, HDL 41,   Started on atorvastatin 40 mg nightly    History of CVA (cerebrovascular accident)  Assessment & Plan  Patient has a history of CVA, does not appear to be on aspirin or Plavix. Stroke pathway as above    Essential hypertension  Assessment & Plan  Pressure on arrival 180s, repeat 160s without intervention. Restarting Coreg and amlodipine today, continue to hold diuretics in setting of poor p.o. intake  Monitor vitals closely    CKD (chronic kidney disease)  Assessment & Plan  Lab Results   Component Value Date    EGFR 83 10/05/2023    EGFR 80 10/04/2023    EGFR 67 10/03/2023    CREATININE 0.84 10/05/2023    CREATININE 0.90 10/04/2023    CREATININE 1.04 10/03/2023   Per chart review creatinine appears to be at baseline  Avoid nephrotoxic agents  Monitor creatinine with a.m. BMP           VTE Pharmacologic Prophylaxis: VTE Score: 8 High Risk (Score >/= 5) - Pharmacological DVT Prophylaxis Ordered: heparin. Sequential Compression Devices Ordered. Patient Centered Rounds: I performed bedside rounds with nursing staff today. Discussions with Specialists or Other Care Team Provider: Cm, neuro    Education and Discussions with Family / Patient: Updated  (daughter) at bedside. Total Time Spent on Date of Encounter in care of patient: 35 mins.  This time was spent on one or more of the following: performing physical exam; counseling and coordination of care; obtaining or reviewing history; documenting in the medical record; reviewing/ordering tests, medications or procedures; communicating with other healthcare professionals and discussing with patient's family/caregivers. Current Length of Stay: 3 day(s)  Current Patient Status: Inpatient   Certification Statement: The patient will continue to require additional inpatient hospital stay due to Continuing dysphagia assessment secondary to TIA versus myasthenia gravis  Discharge Plan: Anticipate discharge in 24-48 hrs to home. Code Status: Level 1 - Full Code    Subjective:   Patient reports small improvement in swelling although still has difficulty after a few times. He currently denies chest pain/pressure, palpitations, headedness, nausea, shortness of breath, or chills. Objective:     Vitals:   Temp (24hrs), Av °F (36.7 °C), Min:97.5 °F (36.4 °C), Max:98.8 °F (37.1 °C)    Temp:  [97.5 °F (36.4 °C)-98.8 °F (37.1 °C)] 97.6 °F (36.4 °C)  HR:  [50-71] 71  Resp:  [18] 18  BP: (156-163)/(64-73) 159/73  SpO2:  [90 %-97 %] 94 %  Body mass index is 32.05 kg/m². Input and Output Summary (last 24 hours): Intake/Output Summary (Last 24 hours) at 10/5/2023 1418  Last data filed at 10/5/2023 0429  Gross per 24 hour   Intake 988.75 ml   Output 180 ml   Net 808.75 ml       Physical Exam:   Physical Exam  Vitals and nursing note reviewed. Constitutional:       Appearance: He is normal weight. HENT:      Head: Normocephalic. Nose: Nose normal.      Mouth/Throat:      Mouth: Mucous membranes are moist.      Pharynx: Oropharynx is clear. Eyes:      General: No scleral icterus. Conjunctiva/sclera: Conjunctivae normal.      Pupils: Pupils are equal, round, and reactive to light. Cardiovascular:      Rate and Rhythm: Normal rate and regular rhythm. Heart sounds: No murmur heard. No friction rub. No gallop. Pulmonary:      Effort: Pulmonary effort is normal. No respiratory distress. Breath sounds: Normal breath sounds. No stridor. No wheezing, rhonchi or rales. Abdominal:      General: Abdomen is flat. Palpations: Abdomen is soft.    Musculoskeletal:         General: Normal range of motion. Cervical back: Normal range of motion and neck supple. Right lower leg: No edema. Left lower leg: No edema. Lymphadenopathy:      Cervical: No cervical adenopathy. Skin:     General: Skin is warm. Coloration: Skin is not jaundiced or pale. Findings: No bruising, erythema or lesion. Neurological:      General: No focal deficit present. Mental Status: He is alert and oriented to person, place, and time. Mental status is at baseline. Cranial Nerves: No cranial nerve deficit. Motor: No weakness. Comments: Continues to have garbled speech although continues to improve compared to day prior   Psychiatric:         Mood and Affect: Mood normal.         Behavior: Behavior normal.         Thought Content: Thought content normal.          Additional Data:     Labs:  Results from last 7 days   Lab Units 10/05/23  0447 10/03/23  0502 10/02/23  0430   WBC Thousand/uL 11.06*   < > 6.73   HEMOGLOBIN g/dL 15.7   < > 14.7   HEMATOCRIT % 46.5   < > 43.5   PLATELETS Thousands/uL 226   < > 216   NEUTROS PCT %  --   --  63   LYMPHS PCT %  --   --  21   MONOS PCT %  --   --  13*   EOS PCT %  --   --  2    < > = values in this interval not displayed.      Results from last 7 days   Lab Units 10/05/23  0447   SODIUM mmol/L 140   POTASSIUM mmol/L 3.3*   CHLORIDE mmol/L 108   CO2 mmol/L 24   BUN mg/dL 21   CREATININE mg/dL 0.84   ANION GAP mmol/L 8   CALCIUM mg/dL 9.1   GLUCOSE RANDOM mg/dL 109     Results from last 7 days   Lab Units 10/01/23  2100   INR  0.98     Results from last 7 days   Lab Units 10/01/23  2043   POC GLUCOSE mg/dl 144*     Results from last 7 days   Lab Units 10/02/23  0430   HEMOGLOBIN A1C % 5.8*           Lines/Drains:  Invasive Devices     Peripheral Intravenous Line  Duration           Peripheral IV 10/01/23 Left Antecubital 3 days    Peripheral IV 10/01/23 Right;Ventral (anterior) Forearm 3 days                      Imaging: No pertinent imaging reviewed. Recent Cultures (last 7 days):         Last 24 Hours Medication List:   Current Facility-Administered Medications   Medication Dose Route Frequency Provider Last Rate   • aluminum-magnesium hydroxide-simethicone  30 mL Oral Q4H PRN Sumanth Garcia MD     • amLODIPine  5 mg Oral Daily Amedeo Sandhoff Prator, PA-C     • aspirin  81 mg Oral Daily Yulia [facility] Saraiya, DO     • aspirin  325 mg Oral Once [place], DO     • atorvastatin  40 mg Oral QPM [facility]     • bisacodyl  10 mg Rectal Daily PRN Amedeo Sandhoff Prator, PA-C     • carvedilol  3.125 mg Oral BID With Meals Amedeo Sandhoff Prator, PA-C     • clopidogrel  300 mg Oral Once [place], DO     • dextrose 5% lactated ringer's  75 mL/hr Intravenous Continuous Amedeo Sandhoff Prator, PA-C 75 mL/hr (10/04/23 2228)   • heparin (porcine)  5,000 Units Subcutaneous [facility]     • hydrALAZINE  5 mg Intravenous Q6H PRN Amedeo Sandhoff Prator, PA-C     • levothyroxine  75 mcg Oral Early Morning [facility], [facility]     • pantoprazole  40 mg Intravenous Q24H [facility] JC Marcano     • pyridostigmine  30 mg Oral TID LUDWIN Allen     • pyridostigmine  30 mg Oral Once LUDWIN Jordan          Today, Patient Was Seen By: Amedeo Sandhoff Prator, PA-C    **Please Note: This note may have been constructed using a voice recognition system. **

## 2023-10-05 NOTE — PLAN OF CARE
Problem: Neurological Deficit  Goal: Neurological status is stable or improving  Description: Interventions:  - Monitor and assess patient's level of consciousness, motor function, sensory function, and level of assistance needed for ADLs. - Monitor and report changes from baseline. Collaborate with interdisciplinary team to initiate plan and implement interventions as ordered. - Provide and maintain a safe environment. - Consider seizure precautions. - Consider fall precautions. - Consider aspiration precautions. - Consider bleeding precautions. Outcome: Progressing    Problem: Communication Impairment  Goal: Ability to express needs and understand communication  Description: Assess patient's communication skills and ability to understand information. Patient will demonstrate use of effective communication techniques, alternative methods of communication and understanding even if not able to speak. - Encourage communication and provide alternate methods of communication as needed. - Collaborate with case management/ for discharge needs. - Include patient/family/caregiver in decisions related to communication. Outcome: Progressing     Problem: Potential for Aspiration  Goal: Non-ventilated patient's risk of aspiration is minimized  Description: Assess and monitor vital signs, respiratory status, and labs (WBC). Monitor for signs of aspiration (tachypnea, cough, rales, wheezing, cyanosis, fever). - Assess and monitor patient's ability to swallow. - Place patient up in chair to eat if possible. - HOB up at 90 degrees to eat if unable to get patient up into chair.  - Supervise patient during oral intake. - Instruct patient/ family to take small bites. - Instruct patient/ family to take small single sips when taking liquids.   - Follow patient-specific strategies generated by speech pathologist.  Outcome: Progressing  Goal: Ventilated patient's risk of aspiration is minimized  Description: Assess and monitor vital signs, respiratory status, airway cuff pressure, and labs (WBC). Monitor for signs of aspiration (tachypnea, cough, rales, wheezing, cyanosis, fever). - Elevate head of bed 30 degrees if patient has tube feeding.  - Monitor tube feeding. Outcome: Progressing     Problem: Nutrition  Goal: Nutrition/Hydration status is improving  Description: Monitor and assess patient's nutrition/hydration status for malnutrition (ex- brittle hair, bruises, dry skin, pale skin and conjunctiva, muscle wasting, smooth red tongue, and disorientation). Collaborate with interdisciplinary team and initiate plan and interventions as ordered. Monitor patient's weight and dietary intake as ordered or per policy. Utilize nutrition screening tool and intervene per policy. Determine patient's food preferences and provide high-protein, high-caloric foods as appropriate. - Assist patient with eating.  - Allow adequate time for meals.  - Encourage patient to take dietary supplement as ordered. - Collaborate with clinical nutritionist.  - Include patient/family/caregiver in decisions related to nutrition.   Outcome: Progressing     Problem: DISCHARGE PLANNING  Goal: Discharge to home or other facility with appropriate resources  Description: INTERVENTIONS:  - Identify barriers to discharge w/patient and caregiver  - Arrange for needed discharge resources and transportation as appropriate  - Identify discharge learning needs (meds, wound care, etc.)  - Arrange for interpretive services to assist at discharge as needed  - Refer to Case Management Department for coordinating discharge planning if the patient needs post-hospital services based on physician/advanced practitioner order or complex needs related to functional status, cognitive ability, or social support system  Outcome: Progressing     Problem: Knowledge Deficit  Goal: Patient/family/caregiver demonstrates understanding of disease process, treatment plan, medications, and discharge instructions  Description: Complete learning assessment and assess knowledge base. Interventions:  - Provide teaching at level of understanding  - Provide teaching via preferred learning methods  Outcome: Progressing     Problem: SAFETY ADULT  Goal: Maintain or return to baseline ADL function  Description: INTERVENTIONS:  -  Assess patient's ability to carry out ADLs; assess patient's baseline for ADL function and identify physical deficits which impact ability to perform ADLs (bathing, care of mouth/teeth, toileting, grooming, dressing, etc.)  - Assess/evaluate cause of self-care deficits   - Assess range of motion  - Assess patient's mobility; develop plan if impaired  - Assess patient's need for assistive devices and provide as appropriate  - Encourage maximum independence but intervene and supervise when necessary  - Involve family in performance of ADLs  - Assess for home care needs following discharge   - Consider OT consult to assist with ADL evaluation and planning for discharge  - Provide patient education as appropriate  Outcome: Progressing  Goal: Maintains/Returns to pre admission functional level  Description: INTERVENTIONS:  - Perform BMAT or MOVE assessment daily.   - Set and communicate daily mobility goal to care team and patient/family/caregiver. - Collaborate with rehabilitation services on mobility goals if consulted  - Perform Range of Motion 4 times a day. - Reposition patient every 2 hours.   - Dangle patient 3 times a day  - Stand patient 3 times a day  - Ambulate patient 4 times a day  - Out of bed to chair 3 times a day   - Out of bed for meals 3 times a day  - Out of bed for toileting  - Record patient progress and toleration of activity level   Outcome: Progressing  Goal: Patient will remain free of falls  Description: INTERVENTIONS:  - Educate patient/family on patient safety including physical limitations  - Instruct patient to call for assistance with activity   - Consult OT/PT to assist with strengthening/mobility   - Keep Call bell within reach  - Keep bed low and locked with side rails adjusted as appropriate  - Keep care items and personal belongings within reach  - Initiate and maintain comfort rounds  - Make Fall Risk Sign visible to staff  - Offer Toileting every 2 Hours, in advance of need  - Initiate/Maintain bed alarm  - Apply yellow socks and bracelet for high fall risk patients  - Consider moving patient to room near nurses station  Outcome: Progressing

## 2023-10-05 NOTE — ASSESSMENT & PLAN NOTE
Lab Results   Component Value Date    EGFR 83 10/05/2023    EGFR 80 10/04/2023    EGFR 67 10/03/2023    CREATININE 0.84 10/05/2023    CREATININE 0.90 10/04/2023    CREATININE 1.04 10/03/2023   Per chart review creatinine appears to be at baseline  Avoid nephrotoxic agents  Monitor creatinine with a.m.  BMP

## 2023-10-05 NOTE — SPEECH THERAPY NOTE
Speech Language/Pathology     Speech/Language Pathology Progress Note     Patient Name: Jefry Wilkins    QNKCZ'N Date: 10/5/2023     Problem List  Principal Problem:    Stroke-like symptoms  Active Problems:    CKD (chronic kidney disease)    Essential hypertension    History of CVA (cerebrovascular accident)    Hypothyroidism    Hyperlipidemia    HFrEF (heart failure with reduced ejection fraction) ([address])    Dysphagia        Recommendations:   Diet: puree/level 1 diet and nectar thick liquids; consider magic cup **small quantities**  Meds: crushed with puree   Feeding Assistance: independent   Frequent Oral care: 2-4x/day  Aspiration precautions and compensatory swallowing strategies: upright posture, slow rate of feeding, small bites/sips, alternating bites and sips and cued cough/expectortate w/ gurgled voal quality  Other Recommendations/ considerations: SLP will follow up to assessment mgmt of oral intake, improvement on swallow function, and determine need for instrumental measures upon pt's ability to manage larger quantities of PO intake x3-5      Subjective:  Patient received awake and alert, continues to eat ice chips w/ frequent expectoration     Previous/current diet: NPO x ice chips     Objective: The following consistencies were tested puree, honey, nectar, thin *teaspoon quantities. Chewable solids held for safety. Patient presents with functinoal bolus containment, manipulation and control. Laryngeal rise palpated; pt continues to elicit between 5-7 swallows per single (teaspoon) bolus. Wet/gurgly vocal quality which is cleared w/ cued cough. Frequent expectoration, subtle signs of distress w/ thins which pt verbally acknowledges. No overt s/s of aspiration. Assessment:  Oropharyngeal swallow function appears slightly improved given no overt s/s of aspiration today.   Pt tolerated very small quantities though continues to elicit multiple swallows per single bolus - noted  fatigue as a result. Recommend  tastes of modified diet to prevent disuse atrophy; consider nutrition consult and magic cup as pt likely unable to sustain nutrition given amount of effort required for very small quantities of intake. Will benefit from video once near baseline/swallow improves for larger quantities of intake. Neurology workup is ongoing.       Plan:   follow up x3-5  D/w PA, RN, [facility], [address] [facility], [address] [facility]  Speech-Language Pathologist  [place] #[id number]  [place] #[id number]

## 2023-10-05 NOTE — ASSESSMENT & PLAN NOTE
Pressure on arrival 180s, repeat 160s without intervention.   Restarting Coreg and amlodipine today, continue to hold diuretics in setting of poor p.o. intake  Monitor vitals closely

## 2023-10-05 NOTE — PLAN OF CARE
Recommendations:   Diet: puree/level 1 diet and nectar thick liquids; consider magic cup **small quantities**  Meds: crushed with puree   Feeding Assistance: independent   Frequent Oral care: 2-4x/day  Aspiration precautions and compensatory swallowing strategies: upright posture, slow rate of feeding, small bites/sips, alternating bites and sips and cued cough/expectortate w/ gurgled voal quality  Other Recommendations/ considerations: SLP will follow up to assessment mgmt of oral intake, improvement on swallow function, and determine need for instrumental measures upon pt's ability to manage larger quantities of PO intake x3-5

## 2023-10-05 NOTE — ASSESSMENT & PLAN NOTE
Stroke alert presenting with slurred speech, dysphagia and numbness near right eye. Symptoms improving. Has mildly slurred speech. Hx of TIA. · Stroke alert, neurology following. Appreciate recommendations. Recommended aspirin and Plavix load continuing with aspirin 81 mg daily followed by Plavix 75 mg daily  · Reports hx of stroke 4 years ago, not on asa or plavix currently.    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

## 2023-10-05 NOTE — PROGRESS NOTES
Progress Note - Neurology   Yudy Lee 77 y.o. male [id number]  Unit/Bed#: /-01    Assessment/Plan:    * Stroke-like symptoms  Assessment & Plan   77 y.o.  male with prior stroke 2019, HTN, HLD, HFrEF, hypothyroid, and CKD who presented to [facility] 10/1/23 with sudden onset slurred speech, difficulty swallowing and a small area of numbness around his right eye. Per previous neurology provider, in the ED, patient and family reported that the numbness resolved and dysarthria/dysphagia were improving. Stroke alert initiated. NIHSS 1 for mild dysarthria. /84, . CTH/CTA notable for right V3 and V4 occlusion (also noted on CTA report 2019). 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 He was not a candidate for TNK as his symptoms were improving with low NIH. Per chart review, pt was recommended to be loaded with  mg and Plavix 300 mg but unfortunately failed swallow evaluation. Later that evening, pt had significant worsening of his dysphagia per chart review. Pt was made NPO, hence only received  mg HI. Repeat CTH stable. Neurodiagnostics   - 10/1/23 CTH/CTA:   "1.  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 Mild stenosis at both terminal ICAs"  - 10/1/23 Repeat CTH:  "No acute intracranial abnormality."  - 10/2/23 MRI brain without contrast:  "1. No acute infarction, edema, or mass effect. 2. Mild chronic microangiopathic ischemic changes. "  - Echo 10/2/23:   "Echocardiogram reviewed, EF 50%, aortic valve sclerosis, atria size normal B/L, limited bubble study negative but probably nondiagnostic due to technical difficulties. "  - Labs   - Lipid panel: total cholesterol 160, TG 81, HDL 41,   - TSH 5.2, free T4 1.03  - A1c 5.8    Etiology of symptoms is unclear, differential includes MRI negative stroke vs myasthenia gravis. Plan  · Pt cleared for puree diet 10/5/23. Start mestinon 30 mg TID   • Consider  mg AR until pt cleared for PO medications   • Atorvastatin 40 mg daily when able   • Goal normotension. • Euglycemic, normothermic goal  • Continue telemetry  • PT/OT/ST  • Stroke education  • Ach receptor binding/blocking/modualting pending. MUSK pending   • Continue to monitor and notify neurology with any changes. - Medical management andcorrection of any metabolic or infectious disturbances per primary service. Hyperlipidemia  Assessment & Plan  - Continue atorvastatin 40mg daily once able to take PO.  - LDL goal <70. Hypothyroidism  Assessment & Plan  - TSH 5.2; recommend checking T4    History of CVA (cerebrovascular accident)  Assessment & Plan  - In regard to his prior stroke, he was evaluated at [facility] for dysphagia, slurred speech and intermittent vertigo. MRI brain 6/27/2019 revealed:   "Tiny acute small vessel infarctions in the right lateral lower brainstem medulla and adjacent right lower cerebellar vermis. 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 Mild focal plaquing and estimated 50% stenoses bilateral cavernous ICA. "  - Consider  mg AR while NPO. Essential hypertension  Assessment & Plan  - Goal normotension, avoid hypotension.   - Management as per primary team.      Patient needs follow-up with neurologist in [place]. Preferably from the [facility]. **History and physical examination obtained by attending neurologist. AP in room as witness to history and physical examination obtained and acted solely as a scribe for attending neurologist. This AP did not provide any decision making for this encounter. **        Subjective:   Pt's daughter reports pt's speech is at baseline this morning. Pt's daughter reports pt was cleared for puree diet today. Past Medical History:   Diagnosis Date   • Hypertension    • TIA (transient ischemic attack)      History reviewed. No pertinent surgical history. History reviewed. No pertinent family history. Social History     Socioeconomic History   • Marital status: Single     Spouse name: None   • Number of children: None   • Years of education: None   • Highest education level: None   Occupational History   • None   Tobacco Use   • Smoking status: Never   • Smokeless tobacco: Never   Vaping Use   • Vaping Use: Never used   Substance and Sexual Activity   • Alcohol use: Never   • Drug use: Never   • Sexual activity: None   Other Topics Concern   • None   Social History Narrative   • None     Social Determinants of Health     Financial Resource Strain: Not on file   Food Insecurity: No Food Insecurity (10/2/2023)    Hunger Vital Sign    • Worried About Running Out of Food in the Last Year: Never true    • Ran Out of Food in the Last Year: Never true   Transportation Needs: No Transportation Needs (10/2/2023)    PRAPARE - Transportation    • Lack of Transportation (Medical): No    • Lack of Transportation (Non-Medical): No   Physical Activity: Not on file   Stress: Not on file   Social Connections: Not on file   Intimate Partner Violence: Not on file   Housing Stability: Low Risk  (10/2/2023)    Housing Stability Vital Sign    • Unable to Pay for Housing in the Last Year: No    • Number of Places Lived in the Last Year: 2    • Unstable Housing in the Last Year: No     E-Cigarette/Vaping   • E-Cigarette Use Never User      E-Cigarette/Vaping Substances         Medications:   All current active meds have been reviewed and current meds:  Scheduled Meds:  Current Facility-Administered Medications   Medication Dose Route Frequency Provider Last Rate   • aluminum-magnesium hydroxide-simethicone  30 mL Oral Q4H PRN Sumanth Garcia MD     • amLODIPine  5 mg Oral Daily Ryan Marcano PA-C     • aspirin  81 mg Oral Daily [facility] Sarai[facility], DO     • aspirin  325 mg Oral Once [place]akes, DO     • atorvastatin  40 mg Oral QPM Yulia Galinaal Saraiya, DO     • bisacodyl  10 mg Rectal Daily PRN Ryan Marcano PA-C     • carvedilol  3.125 mg Oral BID With Meals Ryan Marcano PA-C     • clopidogrel  300 mg Oral Once [place], DO     • dextrose 5% lactated ringer's  75 mL/hr Intravenous Continuous Ryan Marcano PA-C 75 mL/hr (10/04/23 2228)   • heparin (porcine)  5,000 Units Subcutaneous [facility], DO     • hydrALAZINE  5 mg Intravenous Q6H PRN Ryan Marcano PA-C     • levothyroxine  75 mcg Oral Early Morning Yulia Galinaal Saraiya, DO     • pantoprazole  40 mg Intravenous Q24H [address] Ryan Marcano PA-C     • pyridostigmine  30 mg Oral TID Annalee Dye, CRNP     • pyridostigmine  30 mg Oral Once Annalee Dye, CRNP       Continuous Infusions:dextrose 5% lactated ringer's, 75 mL/hr, Last Rate: 75 mL/hr (10/04/23 2228)      PRN Meds:.•  aluminum-magnesium hydroxide-simethicone  •  bisacodyl  •  hydrALAZINE       ROS:   Review of Systems   HENT: Positive for trouble swallowing. Vitals:   /73 (BP Location: Right arm)   Pulse 71   Temp 97.6 °F (36.4 °C) (Oral)   Resp 18   Ht 5' 9" (1.753 m)   Wt 98.4 kg (217 lb)   SpO2 94%   BMI 32.05 kg/m²     Physical Exam:   Physical Exam  Vitals and nursing note reviewed. Exam conducted with a chaperone present (pt's daughter ). HENT:      Head: Normocephalic and atraumatic. Nose: Nose normal.   Cardiovascular:      Comments: Ranges from bradycardic to low/normal heart rate   Pulmonary:      Effort: Pulmonary effort is normal.   Neurological:      Mental Status: He is alert. Psychiatric:      Comments: Pleasant and cooperative during exam        Neurologic Exam     Mental Status   Follows 1 step commands.    Attention: appropriately attends to provider Speech: (No dysarthria. Speech at baseline per pt's daughter )  Level of consciousness: alert  Able to repeat ("Today is a bright and antoinette day." ). Cranial Nerves     CN III, IV, VI   Conjugate gaze: present    CN VIII   Hearing impaired: grossly intact     - coughs after multiple swallows of pureed food   - right side of palate not moving as smoothly as left      Motor Exam   - able to move extremities x 4 antigravity      Gait, Coordination, and Reflexes     Gait  Gait: (ambulates with normal gait )          Labs: I have personally reviewed pertinent reports. Recent Results (from the past 24 hour(s))   Basic metabolic panel    Collection Time: 10/05/23  4:47 AM   Result Value Ref Range    Sodium 140 135 - 147 mmol/L    Potassium 3.3 (L) 3.5 - 5.3 mmol/L    Chloride 108 96 - 108 mmol/L    CO2 24 21 - 32 mmol/L    ANION GAP 8 mmol/L    BUN 21 5 - 25 mg/dL    Creatinine 0.84 0.60 - 1.30 mg/dL    Glucose 109 65 - 140 mg/dL    Calcium 9.1 8.4 - 10.2 mg/dL    eGFR 83 ml/min/1.73sq m   CBC    Collection Time: 10/05/23  4:47 AM   Result Value Ref Range    WBC 11.06 (H) 4.31 - 10.16 Thousand/uL    RBC 5.30 3.88 - 5.62 Million/uL    Hemoglobin 15.7 12.0 - 17.0 g/dL    Hematocrit 46.5 36.5 - 49.3 %    MCV 88 82 - 98 fL    MCH 29.6 26.8 - 34.3 pg    MCHC 33.8 31.4 - 37.4 g/dL    RDW 13.0 11.6 - 15.1 %    Platelets 455 999 - 112 Thousands/uL    MPV 10.5 8.9 - 12.7 fL       Imaging: I have personally reviewed pertinent imaging in PACS, including MRI brain wo contrast 10/2/23, [facility] wo contrast 10/1/23, CTA H/N wwo contrast 10/1/23, CTH wo contrast 10/1/23,  and I have personally reviewed PACS reports. EKG, Pathology, and Other Studies: I have personally reviewed pertinent reports.        VTE Prophylaxis: Sequential compression device (Venodyne)       Counseling / Coordination of Care  Attending neurologist  spent a total time of 35 minutes on 10/05/23 in caring for this patient including Diagnostic results, Prognosis, Risks and benefits of tx options, Instructions for management, Patient and family education, Importance of tx compliance, Risk factor reductions, Impressions, Counseling / Coordination of care, Documenting in the medical record, Reviewing / ordering tests, medicine, procedures  , Obtaining or reviewing history   and Communicating with other healthcare professionals . This note was completed in part utilizing [address] [place] [address]. Grammatical errors, random word insertions, spelling mistakes, and incomplete sentences may be an occasional consequence of this system secondary to software limitations, ambient noise, and hardware issues. If you have any questions or concerns about the content, text, or information contained within the body of this dictation, please contact the provider for clarification.

## 2023-10-05 NOTE — ASSESSMENT & PLAN NOTE
· Dysphagia likely secondary to TIA, slurred speech improving today  · We will continue to have speech therapy evaluate, current recommendations puréed diet with nectar thick liquids, will have barium swallow study tomorrow  · Continue with myasthenia gravis work-up  · We will defer NGT and diet supplementation at this time, may need to be reevaluated if n.p.o. status continues

## 2023-10-06 LAB
ACHR BLOCK AB/ACHR TOTAL SFR SER: 15 % (ref 0–25)
ANION GAP SERPL CALCULATED.3IONS-SCNC: 6 MMOL/L
BUN SERPL-MCNC: 28 MG/DL (ref 5–25)
CALCIUM SERPL-MCNC: 8.9 MG/DL (ref 8.4–10.2)
CHLORIDE SERPL-SCNC: 108 MMOL/L (ref 96–108)
CO2 SERPL-SCNC: 25 MMOL/L (ref 21–32)
CREAT SERPL-MCNC: 0.98 MG/DL (ref 0.6–1.3)
ERYTHROCYTE [DISTWIDTH] IN BLOOD BY AUTOMATED COUNT: 13.1 % (ref 11.6–15.1)
GFR SERPL CREATININE-BSD FRML MDRD: 73 ML/MIN/1.73SQ M
GLUCOSE SERPL-MCNC: 116 MG/DL (ref 65–140)
HCT VFR BLD AUTO: 41.4 % (ref 36.5–49.3)
HGB BLD-MCNC: 14.3 G/DL (ref 12–17)
MCH RBC QN AUTO: 29.5 PG (ref 26.8–34.3)
MCHC RBC AUTO-ENTMCNC: 34.5 G/DL (ref 31.4–37.4)
MCV RBC AUTO: 85 FL (ref 82–98)
PLATELET # BLD AUTO: 240 THOUSANDS/UL (ref 149–390)
PMV BLD AUTO: 10.7 FL (ref 8.9–12.7)
POTASSIUM SERPL-SCNC: 3.1 MMOL/L (ref 3.5–5.3)
RBC # BLD AUTO: 4.85 MILLION/UL (ref 3.88–5.62)
SODIUM SERPL-SCNC: 139 MMOL/L (ref 135–147)
WBC # BLD AUTO: 9.09 THOUSAND/UL (ref 4.31–10.16)

## 2023-10-06 PROCEDURE — 85027 COMPLETE CBC AUTOMATED: CPT

## 2023-10-06 PROCEDURE — C9113 INJ PANTOPRAZOLE SODIUM, VIA: HCPCS

## 2023-10-06 PROCEDURE — 99232 SBSQ HOSP IP/OBS MODERATE 35: CPT

## 2023-10-06 PROCEDURE — 92526 ORAL FUNCTION THERAPY: CPT

## 2023-10-06 PROCEDURE — 99232 SBSQ HOSP IP/OBS MODERATE 35: CPT | Performed by: PSYCHIATRY & NEUROLOGY

## 2023-10-06 PROCEDURE — 80048 BASIC METABOLIC PNL TOTAL CA: CPT

## 2023-10-06 RX ORDER — PYRIDOSTIGMINE BROMIDE 60 MG/1
60 TABLET ORAL 3 TIMES DAILY
Status: DISCONTINUED | OUTPATIENT
Start: 2023-10-06 | End: 2023-10-06

## 2023-10-06 RX ORDER — POTASSIUM CHLORIDE 20 MEQ/1
40 TABLET, EXTENDED RELEASE ORAL ONCE
Status: COMPLETED | OUTPATIENT
Start: 2023-10-06 | End: 2023-10-06

## 2023-10-06 RX ORDER — IBUPROFEN 400 MG/1
400 TABLET ORAL EVERY 6 HOURS PRN
Status: DISCONTINUED | OUTPATIENT
Start: 2023-10-06 | End: 2023-10-07 | Stop reason: HOSPADM

## 2023-10-06 RX ORDER — SODIUM CHLORIDE, SODIUM LACTATE, POTASSIUM CHLORIDE, CALCIUM CHLORIDE 600; 310; 30; 20 MG/100ML; MG/100ML; MG/100ML; MG/100ML
75 INJECTION, SOLUTION INTRAVENOUS CONTINUOUS
Status: DISCONTINUED | OUTPATIENT
Start: 2023-10-06 | End: 2023-10-07 | Stop reason: HOSPADM

## 2023-10-06 RX ORDER — IBUPROFEN 200 MG
200 TABLET ORAL EVERY 6 HOURS PRN
Status: DISCONTINUED | OUTPATIENT
Start: 2023-10-06 | End: 2023-10-06

## 2023-10-06 RX ORDER — PYRIDOSTIGMINE BROMIDE 60 MG/1
60 TABLET ORAL
Status: DISCONTINUED | OUTPATIENT
Start: 2023-10-06 | End: 2023-10-07 | Stop reason: HOSPADM

## 2023-10-06 RX ADMIN — PYRIDOSTIGMINE BROMIDE 30 MG: 60 TABLET ORAL at 09:11

## 2023-10-06 RX ADMIN — HEPARIN SODIUM 5000 UNITS: 5000 INJECTION INTRAVENOUS; SUBCUTANEOUS at 05:31

## 2023-10-06 RX ADMIN — PYRIDOSTIGMINE BROMIDE 60 MG: 60 TABLET ORAL at 17:04

## 2023-10-06 RX ADMIN — HEPARIN SODIUM 5000 UNITS: 5000 INJECTION INTRAVENOUS; SUBCUTANEOUS at 13:24

## 2023-10-06 RX ADMIN — IBUPROFEN 400 MG: 400 TABLET, FILM COATED ORAL at 13:23

## 2023-10-06 RX ADMIN — PANTOPRAZOLE SODIUM 40 MG: 40 INJECTION, POWDER, FOR SOLUTION INTRAVENOUS at 09:12

## 2023-10-06 RX ADMIN — ATORVASTATIN CALCIUM 40 MG: 40 TABLET, FILM COATED ORAL at 17:04

## 2023-10-06 RX ADMIN — AMLODIPINE BESYLATE 5 MG: 5 TABLET ORAL at 09:12

## 2023-10-06 RX ADMIN — HEPARIN SODIUM 5000 UNITS: 5000 INJECTION INTRAVENOUS; SUBCUTANEOUS at 21:10

## 2023-10-06 RX ADMIN — PYRIDOSTIGMINE BROMIDE 60 MG: 60 TABLET ORAL at 11:33

## 2023-10-06 RX ADMIN — IBUPROFEN 200 MG: 200 TABLET, FILM COATED ORAL at 04:22

## 2023-10-06 RX ADMIN — SODIUM CHLORIDE, SODIUM LACTATE, POTASSIUM CHLORIDE, AND CALCIUM CHLORIDE 75 ML/HR: .6; .31; .03; .02 INJECTION, SOLUTION INTRAVENOUS at 09:12

## 2023-10-06 RX ADMIN — POTASSIUM CHLORIDE 40 MEQ: 1500 TABLET, EXTENDED RELEASE ORAL at 09:12

## 2023-10-06 RX ADMIN — DEXTROSE, SODIUM CHLORIDE, SODIUM LACTATE, POTASSIUM CHLORIDE, AND CALCIUM CHLORIDE 75 ML/HR: 5; .6; .31; .03; .02 INJECTION, SOLUTION INTRAVENOUS at 01:54

## 2023-10-06 RX ADMIN — ASPIRIN 81 MG: 81 TABLET, CHEWABLE ORAL at 09:12

## 2023-10-06 RX ADMIN — CARVEDILOL 3.12 MG: 3.12 TABLET, FILM COATED ORAL at 17:04

## 2023-10-06 RX ADMIN — CARVEDILOL 3.12 MG: 3.12 TABLET, FILM COATED ORAL at 09:12

## 2023-10-06 RX ADMIN — IBUPROFEN 400 MG: 400 TABLET, FILM COATED ORAL at 21:10

## 2023-10-06 NOTE — PROGRESS NOTES
[address]  Progress Note  Name: Lorie Moore  MRN: [id number]  Unit/Bed#: -01 I Date of Admission: 10/1/2023   Date of Service: 10/6/2023 I Hospital Day: 4    Assessment/Plan   * Stroke-like symptoms  Assessment & Plan  Stroke alert presenting with slurred speech, dysphagia and numbness near right eye. Symptoms improving. Has mildly slurred speech. Hx of TIA. · Stroke alert, neurology following. Appreciate recommendations. Recommended aspirin and Plavix load continuing with aspirin 81 mg daily followed by Plavix 75 mg daily  · Reports hx of stroke 4 years ago, not on asa or plavix currently.    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 as described, this could represent acute or chronic occlusion  · MRI brain-no acute ischemic event  · May attempt to gradually achieve normotensive BP  · Speech recommending starting puréed diet and may take pills today, however they acknowledge that patient can only take between 3 and 7 spoonfuls before needing a rest  · Neuro consulted-pending myasthenia gravis blood work, started Mestinon yesterday will increase dose today, will continue to trial and see how patient does, currently believes TIA versus myasthenia gravis      Dysphagia  Assessment & Plan  · Dysphagia likely secondary to TIA, slurred speech improving today  · We will continue to have speech therapy evaluate, current recommendations puréed diet with nectar thick liquids  · Continue with myasthenia gravis work-up  · We will defer NGT and diet supplementation at this time, may need to be reevaluated if n.p.o. status is resumed    HFrEF (heart failure with reduced ejection fraction) ([address])  Assessment & Plan  Wt Readings from Last 3 Encounters:   10/02/23 98.4 kg (217 lb)       Per chart review has a history of heart failure with reduced EF on Lasix 20 mg daily  And appears euvolemic on exam  Patient reports not currently taking Lasix  Intake and output  Monitor creatinine with a.m. BMP  We will need to continue to monitor respiratory status as we will continue giving gentle IVF in the setting of poor p.o. intake    History of CVA (cerebrovascular accident)  Assessment & Plan  Patient has a history of CVA, does not appear to be on aspirin or Plavix. Stroke pathway as above    Essential hypertension  Assessment & Plan  Pressure on arrival 180s, repeat 160s without intervention. Restarting Coreg and amlodipine today, continue to hold diuretics in setting of poor p.o. intake  Monitor vitals closely    CKD (chronic kidney disease)  Assessment & Plan  Lab Results   Component Value Date    EGFR 73 10/06/2023    EGFR 83 10/05/2023    EGFR 80 10/04/2023    CREATININE 0.98 10/06/2023    CREATININE 0.84 10/05/2023    CREATININE 0.90 10/04/2023   Per chart review creatinine appears to be at baseline  Avoid nephrotoxic agents  Monitor creatinine with a.m. BMP           VTE Pharmacologic Prophylaxis: VTE Score: 8 High Risk (Score >/= 5) - Pharmacological DVT Prophylaxis Ordered: heparin. Sequential Compression Devices Ordered. Patient Centered Rounds: I performed bedside rounds with nursing staff today. Discussions with Specialists or Other Care Team Provider: CM, neurology, speech    Education and Discussions with Family / Patient: Updated  (daughter) at bedside. Total Time Spent on Date of Encounter in care of patient: 35 mins. This time was spent on one or more of the following: performing physical exam; counseling and coordination of care; obtaining or reviewing history; documenting in the medical record; reviewing/ordering tests, medications or procedures; communicating with other healthcare professionals and discussing with patient's family/caregivers.     Current Length of Stay: 4 day(s)  Current Patient Status: Inpatient   Certification Statement: The patient will continue to require additional inpatient hospital stay due to Increasing Mestinon, continue to evaluate speech  Discharge Plan: Anticipate discharge tomorrow to home. Code Status: Level 1 - Full Code    Subjective:   Patient reports slight improvement in swallowing however still has difficulty with foods or pills. He denies chest pain/pressure, palpitations, lightheadedness, nausea, shortness of breath, or chills. Objective:     Vitals:   Temp (24hrs), Av.8 °F (36.6 °C), Min:97.5 °F (36.4 °C), Max:98.1 °F (36.7 °C)    Temp:  [97.5 °F (36.4 °C)-98.1 °F (36.7 °C)] 97.5 °F (36.4 °C)  HR:  [51-63] 58  Resp:  [18] 18  BP: (158-164)/(70-73) 162/71  SpO2:  [90 %-94 %] 92 %  Body mass index is 32.05 kg/m². Input and Output Summary (last 24 hours): Intake/Output Summary (Last 24 hours) at 10/6/2023 1148  Last data filed at 10/6/2023 0155  Gross per 24 hour   Intake 880 ml   Output --   Net 880 ml       Physical Exam:   Physical Exam  Vitals and nursing note reviewed. Constitutional:       Appearance: He is normal weight. HENT:      Head: Normocephalic. Nose: Nose normal.      Mouth/Throat:      Mouth: Mucous membranes are moist.      Pharynx: Oropharynx is clear. Eyes:      General: No scleral icterus. Conjunctiva/sclera: Conjunctivae normal.      Pupils: Pupils are equal, round, and reactive to light. Cardiovascular:      Rate and Rhythm: Normal rate and regular rhythm. Heart sounds: No murmur heard. No friction rub. No gallop. Pulmonary:      Effort: Pulmonary effort is normal. No respiratory distress. Breath sounds: Normal breath sounds. No stridor. No wheezing, rhonchi or rales. Abdominal:      General: Abdomen is flat. Palpations: Abdomen is soft. Musculoskeletal:         General: Normal range of motion. Cervical back: Normal range of motion and neck supple. Right lower leg: No edema. Left lower leg: No edema. Lymphadenopathy:      Cervical: No cervical adenopathy.    Skin:     General: Skin is warm. Coloration: Skin is not jaundiced or pale. Findings: No bruising, erythema or lesion. Neurological:      General: No focal deficit present. Mental Status: He is alert and oriented to person, place, and time. Mental status is at baseline. Cranial Nerves: No cranial nerve deficit. Motor: No weakness. Comments: Speech is significantly improved compared to yesterday and the day prior   Psychiatric:         Mood and Affect: Mood normal.         Behavior: Behavior normal.         Thought Content: Thought content normal.          Additional Data:     Labs:  Results from last 7 days   Lab Units 10/06/23  0542 10/03/23  0502 10/02/23  0430   WBC Thousand/uL 9.09   < > 6.73   HEMOGLOBIN g/dL 14.3   < > 14.7   HEMATOCRIT % 41.4   < > 43.5   PLATELETS Thousands/uL 240   < > 216   NEUTROS PCT %  --   --  63   LYMPHS PCT %  --   --  21   MONOS PCT %  --   --  13*   EOS PCT %  --   --  2    < > = values in this interval not displayed. Results from last 7 days   Lab Units 10/06/23  0542   SODIUM mmol/L 139   POTASSIUM mmol/L 3.1*   CHLORIDE mmol/L 108   CO2 mmol/L 25   BUN mg/dL 28*   CREATININE mg/dL 0.98   ANION GAP mmol/L 6   CALCIUM mg/dL 8.9   GLUCOSE RANDOM mg/dL 116     Results from last 7 days   Lab Units 10/01/23  2100   INR  0.98     Results from last 7 days   Lab Units 10/01/23  2043   POC GLUCOSE mg/dl 144*     Results from last 7 days   Lab Units 10/02/23  0430   HEMOGLOBIN A1C % 5.8*           Lines/Drains:  Invasive Devices     Peripheral Intravenous Line  Duration           Peripheral IV 10/06/23 Proximal;Right;Ventral (anterior) Forearm <1 day                      Imaging: No pertinent imaging reviewed.     Recent Cultures (last 7 days):         Last 24 Hours Medication List:   Current Facility-Administered Medications   Medication Dose Route Frequency Provider Last Rate   • aluminum-magnesium hydroxide-simethicone  30 mL Oral Q4H PRN Sumanth Garcia MD     • amLODIPine  5 mg Oral Daily Chaitanya Marcano PA-C     • aspirin  81 mg Oral Daily Yulia Galina Saraiya, DO     • aspirin  325 mg Oral Once [place], DO     • atorvastatin  40 mg Oral QPM Yulia Galina Saraiya, DO     • bisacodyl  10 mg Rectal Daily PRN Chaitanya Marcano PA-C     • carvedilol  3.125 mg Oral BID With Meals Chaitanya Marcano PA-C     • clopidogrel  300 mg Oral Once [place], DO     • heparin (porcine)  5,000 Units Subcutaneous [facility] Yulia Galina Saraiya, DO     • hydrALAZINE  5 mg Intravenous Q6H PRN Chaitanya Marcano PA-C     • ibuprofen  200 mg Oral Q6H PRN Caryle Cai, PA-C     • lactated ringers  75 mL/hr Intravenous Continuous Chaitanya Marcano PA-C 75 mL/hr (10/06/23 0912)   • levothyroxine  75 mcg Oral Early Morning Yulia Galina Saraiya, DO     • pantoprazole  40 mg Intravenous Q24H [address] Ariadna Marcano PA-C     • pyridostigmine  60 mg Oral TID [facility] Chaitanya Marcano PA-C          Today, Patient Was Seen By: Chaitanya Marcano PA-C    **Please Note: This note may have been constructed using a voice recognition system. **

## 2023-10-06 NOTE — PLAN OF CARE
Problem: MOBILITY - ADULT  Goal: Maintain or return to baseline ADL function  Description: INTERVENTIONS:  -  Assess patient's ability to carry out ADLs; assess patient's baseline for ADL function and identify physical deficits which impact ability to perform ADLs (bathing, care of mouth/teeth, toileting, grooming, dressing, etc.)  - Assess/evaluate cause of self-care deficits   - Assess range of motion  - Assess patient's mobility; develop plan if impaired  - Assess patient's need for assistive devices and provide as appropriate  - Encourage maximum independence but intervene and supervise when necessary  - Involve family in performance of ADLs  - Assess for home care needs following discharge   - Consider OT consult to assist with ADL evaluation and planning for discharge  - Provide patient education as appropriate  Outcome: Progressing  Goal: Maintains/Returns to pre admission functional level  Description: INTERVENTIONS:  - Perform BMAT or MOVE assessment daily.   - Set and communicate daily mobility goal to care team and patient/family/caregiver. - Collaborate with rehabilitation services on mobility goals if consulted  - Perform Range of Motion 3 times a day. - Reposition patient every 2 hours.   - Dangle patient 3 times a day  - Stand patient 3 times a day  - Ambulate patient 3 times a day  - Out of bed to chair 3 times a day   - Out of bed for meals 3 times a day  - Out of bed for toileting  - Record patient progress and toleration of activity level   Outcome: Progressing     Problem: PAIN - ADULT  Goal: Verbalizes/displays adequate comfort level or baseline comfort level  Description: Interventions:  - Encourage patient to monitor pain and request assistance  - Assess pain using appropriate pain scale  - Administer analgesics based on type and severity of pain and evaluate response  - Implement non-pharmacological measures as appropriate and evaluate response  - Consider cultural and social influences on pain and pain management  - Notify physician/advanced practitioner if interventions unsuccessful or patient reports new pain  Outcome: Progressing     Problem: INFECTION - ADULT  Goal: Absence or prevention of progression during hospitalization  Description: INTERVENTIONS:  - Assess and monitor for signs and symptoms of infection  - Monitor lab/diagnostic results  - Monitor all insertion sites, i.e. indwelling lines, tubes, and drains  - Monitor endotracheal if appropriate and nasal secretions for changes in amount and color  - [place] appropriate cooling/warming therapies per order  - Administer medications as ordered  - Instruct and encourage patient and family to use good hand hygiene technique  - Identify and instruct in appropriate isolation precautions for identified infection/condition  Outcome: Progressing  Goal: Absence of fever/infection during neutropenic period  Description: INTERVENTIONS:  - Monitor WBC    Outcome: Progressing     Problem: SAFETY ADULT  Goal: Maintain or return to baseline ADL function  Description: INTERVENTIONS:  -  Assess patient's ability to carry out ADLs; assess patient's baseline for ADL function and identify physical deficits which impact ability to perform ADLs (bathing, care of mouth/teeth, toileting, grooming, dressing, etc.)  - Assess/evaluate cause of self-care deficits   - Assess range of motion  - Assess patient's mobility; develop plan if impaired  - Assess patient's need for assistive devices and provide as appropriate  - Encourage maximum independence but intervene and supervise when necessary  - Involve family in performance of ADLs  - Assess for home care needs following discharge   - Consider OT consult to assist with ADL evaluation and planning for discharge  - Provide patient education as appropriate  Outcome: Progressing  Goal: Maintains/Returns to pre admission functional level  Description: INTERVENTIONS:  - Perform BMAT or MOVE assessment daily.   - Set and communicate daily mobility goal to care team and patient/family/caregiver. - Collaborate with rehabilitation services on mobility goals if consulted  - Perform Range of Motion 3 times a day. - Reposition patient every 2 hours.   - Dangle patient 3 times a day  - Stand patient 3 times a day  - Ambulate patient 3 times a day  - Out of bed to chair 3 times a day   - Out of bed for meals 3 times a day  - Out of bed for toileting  - Record patient progress and toleration of activity level   Outcome: Progressing  Goal: Patient will remain free of falls  Description: INTERVENTIONS:  - Educate patient/family on patient safety including physical limitations  - Instruct patient to call for assistance with activity   - Consult OT/PT to assist with strengthening/mobility   - Keep Call bell within reach  - Keep bed low and locked with side rails adjusted as appropriate  - Keep care items and personal belongings within reach  - Initiate and maintain comfort rounds  - Make Fall Risk Sign visible to staff  - Offer Toileting every 2 Hours, in advance of need  - Initiate/Maintain bed alarm  - Obtain necessary fall risk management equipment:   - Apply yellow socks and bracelet for high fall risk patients  - Consider moving patient to room near nurses station  Outcome: Progressing     Problem: DISCHARGE PLANNING  Goal: Discharge to home or other facility with appropriate resources  Description: INTERVENTIONS:  - Identify barriers to discharge w/patient and caregiver  - Arrange for needed discharge resources and transportation as appropriate  - Identify discharge learning needs (meds, wound care, etc.)  - Arrange for interpretive services to assist at discharge as needed  - Refer to Case Management Department for coordinating discharge planning if the patient needs post-hospital services based on physician/advanced practitioner order or complex needs related to functional status, cognitive ability, or social support system  Outcome: Progressing     Problem: Knowledge Deficit  Goal: Patient/family/caregiver demonstrates understanding of disease process, treatment plan, medications, and discharge instructions  Description: Complete learning assessment and assess knowledge base. Interventions:  - Provide teaching at level of understanding  - Provide teaching via preferred learning methods  Outcome: Progressing     Problem: Prexisting or High Potential for Compromised Skin Integrity  Goal: Skin integrity is maintained or improved  Description: INTERVENTIONS:  - Identify patients at risk for skin breakdown  - Assess and monitor skin integrity  - Assess and monitor nutrition and hydration status  - Monitor labs   - Assess for incontinence   - Turn and reposition patient  - Assist with mobility/ambulation  - Relieve pressure over bony prominences  - Avoid friction and shearing  - Provide appropriate hygiene as needed including keeping skin clean and dry  - Evaluate need for skin moisturizer/barrier cream  - Collaborate with interdisciplinary team   - Patient/family teaching  - Consider wound care consult   Outcome: Progressing     Problem: Nutrition/Hydration-ADULT  Goal: Nutrient/Hydration intake appropriate for improving, restoring or maintaining nutritional needs  Description: Monitor and assess patient's nutrition/hydration status for malnutrition. Collaborate with interdisciplinary team and initiate plan and interventions as ordered. Monitor patient's weight and dietary intake as ordered or per policy. Utilize nutrition screening tool and intervene as necessary. Determine patient's food preferences and provide high-protein, high-caloric foods as appropriate.      INTERVENTIONS:  - Monitor oral intake, urinary output, labs, and treatment plans  - Assess nutrition and hydration status and recommend course of action  - Evaluate amount of meals eaten  - Assist patient with eating if necessary   - Allow adequate time for meals  - Recommend/ encourage appropriate diets, oral nutritional supplements, and vitamin/mineral supplements  - Order, calculate, and assess calorie counts as needed  - Recommend, monitor, and adjust tube feedings and TPN/PPN based on assessed needs  - Assess need for intravenous fluids  - Provide specific nutrition/hydration education as appropriate  - Include patient/family/caregiver in decisions related to nutrition  Outcome: Progressing     Problem: Neurological Deficit  Goal: Neurological status is stable or improving  Description: Interventions:  - Monitor and assess patient's level of consciousness, motor function, sensory function, and level of assistance needed for ADLs. - Monitor and report changes from baseline. Collaborate with interdisciplinary team to initiate plan and implement interventions as ordered. - Provide and maintain a safe environment. - Consider seizure precautions. - Consider fall precautions. - Consider aspiration precautions. - Consider bleeding precautions. Outcome: Progressing     Problem: Activity Intolerance/Impaired Mobility  Goal: Mobility/activity is maintained at optimum level for patient  Description: Interventions:  - Assess and monitor patient  barriers to mobility and need for assistive/adaptive devices. - Assess patient's emotional response to limitations. - Collaborate with interdisciplinary team and initiate plans and interventions as ordered. - Encourage independent activity per ability.  - Maintain proper body alignment. - Perform active/passive rom as tolerated/ordered. - Plan activities to conserve energy.  - Turn patient as appropriate  Outcome: Progressing     Problem: Communication Impairment  Goal: Ability to express needs and understand communication  Description: Assess patient's communication skills and ability to understand information.   Patient will demonstrate use of effective communication techniques, alternative methods of communication and understanding even if not able to speak. - Encourage communication and provide alternate methods of communication as needed. - Collaborate with case management/ for discharge needs. - Include patient/family/caregiver in decisions related to communication. Outcome: Progressing     Problem: Potential for Aspiration  Goal: Non-ventilated patient's risk of aspiration is minimized  Description: Assess and monitor vital signs, respiratory status, and labs (WBC). Monitor for signs of aspiration (tachypnea, cough, rales, wheezing, cyanosis, fever). - Assess and monitor patient's ability to swallow. - Place patient up in chair to eat if possible. - HOB up at 90 degrees to eat if unable to get patient up into chair.  - Supervise patient during oral intake. - Instruct patient/ family to take small bites. - Instruct patient/ family to take small single sips when taking liquids. - Follow patient-specific strategies generated by speech pathologist.  Outcome: Progressing  Goal: Ventilated patient's risk of aspiration is minimized  Description: Assess and monitor vital signs, respiratory status, airway cuff pressure, and labs (WBC). Monitor for signs of aspiration (tachypnea, cough, rales, wheezing, cyanosis, fever). - Elevate head of bed 30 degrees if patient has tube feeding.  - Monitor tube feeding. Outcome: Progressing     Problem: Nutrition  Goal: Nutrition/Hydration status is improving  Description: Monitor and assess patient's nutrition/hydration status for malnutrition (ex- brittle hair, bruises, dry skin, pale skin and conjunctiva, muscle wasting, smooth red tongue, and disorientation). Collaborate with interdisciplinary team and initiate plan and interventions as ordered. Monitor patient's weight and dietary intake as ordered or per policy. Utilize nutrition screening tool and intervene per policy.  Determine patient's food preferences and provide high-protein, high-caloric foods as appropriate. - Assist patient with eating.  - Allow adequate time for meals.  - Encourage patient to take dietary supplement as ordered. - Collaborate with clinical nutritionist.  - Include patient/family/caregiver in decisions related to nutrition.   Outcome: Progressing

## 2023-10-06 NOTE — PLAN OF CARE
Problem: Neurological Deficit  Goal: Neurological status is stable or improving  Description: Interventions:  - Monitor and assess patient's level of consciousness, motor function, sensory function, and level of assistance needed for ADLs. - Monitor and report changes from baseline. Collaborate with interdisciplinary team to initiate plan and implement interventions as ordered. - Provide and maintain a safe environment. - Consider seizure precautions. - Consider fall precautions. - Consider aspiration precautions. - Consider bleeding precautions. Outcome: Progressing     Problem: Potential for Aspiration  Goal: Non-ventilated patient's risk of aspiration is minimized  Description: Assess and monitor vital signs, respiratory status, and labs (WBC). Monitor for signs of aspiration (tachypnea, cough, rales, wheezing, cyanosis, fever). - Assess and monitor patient's ability to swallow. - Place patient up in chair to eat if possible. - HOB up at 90 degrees to eat if unable to get patient up into chair.  - Supervise patient during oral intake. - Instruct patient/ family to take small bites. - Instruct patient/ family to take small single sips when taking liquids. - Follow patient-specific strategies generated by speech pathologist.  Outcome: Progressing  Goal: Ventilated patient's risk of aspiration is minimized  Description: Assess and monitor vital signs, respiratory status, airway cuff pressure, and labs (WBC). Monitor for signs of aspiration (tachypnea, cough, rales, wheezing, cyanosis, fever). - Elevate head of bed 30 degrees if patient has tube feeding.  - Monitor tube feeding. Outcome: Progressing     Problem: Nutrition  Goal: Nutrition/Hydration status is improving  Description: Monitor and assess patient's nutrition/hydration status for malnutrition (ex- brittle hair, bruises, dry skin, pale skin and conjunctiva, muscle wasting, smooth red tongue, and disorientation).  Collaborate with interdisciplinary team and initiate plan and interventions as ordered. Monitor patient's weight and dietary intake as ordered or per policy. Utilize nutrition screening tool and intervene per policy. Determine patient's food preferences and provide high-protein, high-caloric foods as appropriate. - Assist patient with eating.  - Allow adequate time for meals.  - Encourage patient to take dietary supplement as ordered. - Collaborate with clinical nutritionist.  - Include patient/family/caregiver in decisions related to nutrition. Outcome: Progressing     Problem: DISCHARGE PLANNING  Goal: Discharge to home or other facility with appropriate resources  Description: INTERVENTIONS:  - Identify barriers to discharge w/patient and caregiver  - Arrange for needed discharge resources and transportation as appropriate  - Identify discharge learning needs (meds, wound care, etc.)  - Arrange for interpretive services to assist at discharge as needed  - Refer to Case Management Department for coordinating discharge planning if the patient needs post-hospital services based on physician/advanced practitioner order or complex needs related to functional status, cognitive ability, or social support system  Outcome: Progressing     Problem: Knowledge Deficit  Goal: Patient/family/caregiver demonstrates understanding of disease process, treatment plan, medications, and discharge instructions  Description: Complete learning assessment and assess knowledge base.   Interventions:  - Provide teaching at level of understanding  - Provide teaching via preferred learning methods  Outcome: Progressing

## 2023-10-06 NOTE — CASE MANAGEMENT
Case Management Progress Note    Patient name Ara Cee  Location /-95 MRN [id number]  : 1945 Date 10/6/2023       LOS (days): 4  Geometric Mean LOS (GMLOS) (days):   Days to GMLOS:        OBJECTIVE:  Current admission status: Inpatient  Preferred Pharmacy:   [address] [place] [address], [address] [place] [address]  [address]  Phone: [phone] Fax: [phone]    Primary Care Provider: No primary care provider on file. Primary Insurance: [employer]  Secondary Insurance: MEDICARE MISC REPLACEMENT    PROGRESS NOTE:  Patient continues to be reviewed daily during Interdisciplinary Rounds with SLIM. Patient anticipated for d/c home 24hrs. Patient will be returning to [place] upon d/c. No additional CM needs identified at this time. Will remain available for needs.

## 2023-10-06 NOTE — ASSESSMENT & PLAN NOTE
· Dysphagia likely secondary to TIA, slurred speech improving today  · We will continue to have speech therapy evaluate, current recommendations puréed diet with nectar thick liquids  · Continue with myasthenia gravis work-up  · We will defer NGT and diet supplementation at this time, may need to be reevaluated if n.p.o. status is resumed

## 2023-10-06 NOTE — DISCHARGE INSTR - DIET
Puree/nectar  Meds crushed in puree  Upright 90'  Monitor for s/s of aspiration   Recommend to continue on modified diet w/ close SLP follow up including MBSS and outpatient tx as indicated- this may be done as an outpatient closer to home.     D/w pt, family, PA

## 2023-10-06 NOTE — ASSESSMENT & PLAN NOTE
Lab Results   Component Value Date    EGFR 73 10/06/2023    EGFR 83 10/05/2023    EGFR 80 10/04/2023    CREATININE 0.98 10/06/2023    CREATININE 0.84 10/05/2023    CREATININE 0.90 10/04/2023   Per chart review creatinine appears to be at baseline  Avoid nephrotoxic agents  Monitor creatinine with a.m.  BMP

## 2023-10-06 NOTE — PROGRESS NOTES
Progress Note - Neurology   Carla Nitro 77 y.o. male [id number]  Unit/Bed#: /-01    Assessment/Plan:    * Stroke-like symptoms  Assessment & Plan   77 y.o.  male with prior stroke 2019, HTN, HLD, HFrEF, hypothyroid, and CKD who presented to [facility] 10/1/23 with sudden onset slurred speech, difficulty swallowing and a small area of numbness around his right eye. Per previous neurology provider, in the ED, patient and family reported that the numbness resolved and dysarthria/dysphagia were improving. Stroke alert initiated. NIHSS 1 for mild dysarthria. /84, . CTH/CTA notable for right V3 and V4 occlusion (also noted on CTA report 2019). 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 He was not a candidate for TNK as his symptoms were improving with low NIH. Per chart review, pt was recommended to be loaded with  mg and Plavix 300 mg but unfortunately failed swallow evaluation. Later that evening, pt had significant worsening of his dysphagia per chart review. Pt was made NPO, hence only received  mg MN. Repeat CTH stable. Neurodiagnostics   - 10/1/23 CTH/CTA:   "1.  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 Mild stenosis at both terminal ICAs"  - 10/1/23 Repeat CTH:  "No acute intracranial abnormality."  - 10/2/23 MRI brain without contrast:  "1. No acute infarction, edema, or mass effect. 2. Mild chronic microangiopathic ischemic changes. "  - Echo 10/2/23:   "Echocardiogram reviewed, EF 50%, aortic valve sclerosis, atria size normal B/L, limited bubble study negative but probably nondiagnostic due to technical difficulties. "  - Labs   - Lipid panel: total cholesterol 160, TG 81, HDL 41,   - TSH 5.2, free T4 1.03  - A1c 5.8    Etiology of symptoms is unclear, differential includes MRI negative stroke vs myasthenia gravis. Plan  · Pt cleared for puree diet 10/5/23. Increase mestinon to 60 mg TID. Attending neurologist discussed proper medication administration (taking medication prior to eating)  • Consider  mg OK until pt cleared for PO medications   • Atorvastatin 40 mg daily when able   • Goal normotension. • Euglycemic, normothermic goal  • Continue telemetry  • PT/OT/ST  • Stroke education  • Ach receptor binding/blocking/modualting pending. MUSK pending   • Continue to monitor and notify neurology with any changes. - Medical management andcorrection of any metabolic or infectious disturbances per primary service. Hyperlipidemia  Assessment & Plan  - Continue atorvastatin 40mg daily once able to take PO.  - LDL goal <70. Hypothyroidism  Assessment & Plan  - TSH 5.2; recommend checking T4    History of CVA (cerebrovascular accident)  Assessment & Plan  - In regard to his prior stroke, he was evaluated at [facility] for dysphagia, slurred speech and intermittent vertigo. MRI brain 6/27/2019 revealed:   "Tiny acute small vessel infarctions in the right lateral lower brainstem medulla and adjacent right lower cerebellar vermis. 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 Mild focal plaquing and estimated 50% stenoses bilateral cavernous ICA. "  - Consider  mg OK while NPO. Essential hypertension  Assessment & Plan  - Goal normotension, avoid hypotension.   - Management as per primary team.      Pt will need neurologic follow-up as an outpatient near his home in [place], [place].  Pt and pt's daughter voiced understanding and compliance with plan     **History and physical examination obtained by attending neurologist. AP in room as witness to history and physical examination obtained and acted solely as a scribe for attending neurologist. This AP did not provide any decision making for this encounter. **        Subjective:   Pt reports swallowing today is a little easier when compared to swallowing yesterday. Past Medical History:   Diagnosis Date   • Hypertension    • TIA (transient ischemic attack)      History reviewed. No pertinent surgical history. History reviewed. No pertinent family history. Social History     Socioeconomic History   • Marital status: Single     Spouse name: None   • Number of children: None   • Years of education: None   • Highest education level: None   Occupational History   • None   Tobacco Use   • Smoking status: Never   • Smokeless tobacco: Never   Vaping Use   • Vaping Use: Never used   Substance and Sexual Activity   • Alcohol use: Never   • Drug use: Never   • Sexual activity: None   Other Topics Concern   • None   Social History Narrative   • None     Social Determinants of Health     Financial Resource Strain: Not on file   Food Insecurity: No Food Insecurity (10/2/2023)    Hunger Vital Sign    • Worried About Running Out of Food in the Last Year: Never true    • Ran Out of Food in the Last Year: Never true   Transportation Needs: No Transportation Needs (10/2/2023)    PRAPARE - Transportation    • Lack of Transportation (Medical): No    • Lack of Transportation (Non-Medical): No   Physical Activity: Not on file   Stress: Not on file   Social Connections: Not on file   Intimate Partner Violence: Not on file   Housing Stability: Low Risk  (10/2/2023)    Housing Stability Vital Sign    • Unable to Pay for Housing in the Last Year: No    • Number of Places Lived in the Last Year: 2    • Unstable Housing in the Last Year: No     E-Cigarette/Vaping   • E-Cigarette Use Never User      E-Cigarette/Vaping Substances         Medications:   All current active meds have been reviewed and current meds:  Scheduled Meds:  Current Facility-Administered Medications   Medication Dose Route Frequency Provider Last Rate   • aluminum-magnesium hydroxide-simethicone  30 mL Oral Q4H PRN Sumanth Garcia MD     • amLODIPine  5 mg Oral Daily Estrella Marcano PA-C     • aspirin  81 mg Oral Daily Yulia Galina Saraiya, DO     • aspirin  325 mg Oral Once [place], DO     • atorvastatin  40 mg Oral QPM Yulia Galinaal Saraiya, DO     • bisacodyl  10 mg Rectal Daily PRN Estrella Marcano PA-C     • carvedilol  3.125 mg Oral BID With Meals Estrella Marcano PA-C     • clopidogrel  300 mg Oral Once [place], DO     • heparin (porcine)  5,000 Units Subcutaneous [facility] Yulia Galinaal Saraiya, DO     • hydrALAZINE  5 mg Intravenous Q6H PRN Estrella Marcano PA-C     • ibuprofen  200 mg Oral Q6H PRN Kevin Ortiz PA-C     • lactated ringers  75 mL/hr Intravenous Continuous Estrella Marcano PA-C 75 mL/hr (10/06/23 0912)   • levothyroxine  75 mcg Oral Early Morning Yulia Galina Saraiya, DO     • pantoprazole  40 mg Intravenous Q24H [facility] JC Marcano     • pyridostigmine  60 mg Oral TID Cassia Cruz PA-C       Continuous Infusions:lactated ringers, 75 mL/hr, Last Rate: 75 mL/hr (10/06/23 0912)      PRN Meds:.•  aluminum-magnesium hydroxide-simethicone  •  bisacodyl  •  hydrALAZINE  •  ibuprofen       ROS:   Review of Systems   HENT: Positive for trouble swallowing. Vitals:   /73 (BP Location: Right arm)   Pulse (!) 51   Temp 98 °F (36.7 °C) (Oral)   Resp 18   Ht 5' 9" (1.753 m)   Wt 98.4 kg (217 lb)   SpO2 93%   BMI 32.05 kg/m²     Physical Exam:   Physical Exam  Vitals and nursing note reviewed. Constitutional:       General: He is not in acute distress. Appearance: He is not diaphoretic. HENT:      Head: Normocephalic and atraumatic. Mouth/Throat:      Comments: Coughing and spitting up thick mucous throughout the exam   Cardiovascular:      Rate and Rhythm: Bradycardia present.    Pulmonary:      Effort: Pulmonary effort is normal.   Neurological: Mental Status: He is alert. Psychiatric:      Comments: Pleasant and cooperative during exam        Neurologic Exam     Mental Status   Follows 1 step commands. Attention: appropriately attends to provider    Speech: (Speech fluent. No dysarthria appreciated during exam. Speech somewhat weak. )  Level of consciousness: alert    Cranial Nerves     CN II   Visual acuity: (appears grossly intact )    CN VII   Facial expression full, symmetric. Right facial weakness: none  Left facial weakness: none    CN VIII   Hearing impaired: grossly intact   - palate excursion midline and moving well b/l      Motor Exam   - spontaneously moving extremities x4 throughout exam. Pt able to self feed during exam      Gait, Coordination, and Reflexes     Gait  Gait: (deferred for patient safety )          Labs: I have personally reviewed pertinent reports. Recent Results (from the past 24 hour(s))   Basic metabolic panel    Collection Time: 10/06/23  5:42 AM   Result Value Ref Range    Sodium 139 135 - 147 mmol/L    Potassium 3.1 (L) 3.5 - 5.3 mmol/L    Chloride 108 96 - 108 mmol/L    CO2 25 21 - 32 mmol/L    ANION GAP 6 mmol/L    BUN 28 (H) 5 - 25 mg/dL    Creatinine 0.98 0.60 - 1.30 mg/dL    Glucose 116 65 - 140 mg/dL    Calcium 8.9 8.4 - 10.2 mg/dL    eGFR 73 ml/min/1.73sq m   CBC    Collection Time: 10/06/23  5:42 AM   Result Value Ref Range    WBC 9.09 4.31 - 10.16 Thousand/uL    RBC 4.85 3.88 - 5.62 Million/uL    Hemoglobin 14.3 12.0 - 17.0 g/dL    Hematocrit 41.4 36.5 - 49.3 %    MCV 85 82 - 98 fL    MCH 29.5 26.8 - 34.3 pg    MCHC 34.5 31.4 - 37.4 g/dL    RDW 13.1 11.6 - 15.1 %    Platelets 946 873 - 372 Thousands/uL    MPV 10.7 8.9 - 12.7 fL       Imaging: I have personally reviewed pertinent imaging in PACS, including MRI brain wo contrast 10/2/23, [facility] wo contrast 10/1/23, CTA H/N wwo contrast 10/1/23, CTH wo contrast 10/1/23,  and I have personally reviewed PACS reports.      EKG, Pathology, and Other Studies: I have personally reviewed pertinent reports. VTE Prophylaxis: Sequential compression device (Venodyne)       Counseling / Coordination of Care  Attending neurologist spent a total time of 35 minutes on 10/06/23 in caring for this patient including Diagnostic results, Prognosis, Risks and benefits of tx options, Instructions for management, Patient and family education, Importance of tx compliance, Risk factor reductions, Impressions, Counseling / Coordination of care, Documenting in the medical record, Reviewing / ordering tests, medicine, procedures  , Obtaining or reviewing history   and Communicating with other healthcare professionals . This note was completed in part utilizing [address] [place] [address]. Grammatical errors, random word insertions, spelling mistakes, and incomplete sentences may be an occasional consequence of this system secondary to software limitations, ambient noise, and hardware issues. If you have any questions or concerns about the content, text, or information contained within the body of this dictation, please contact the provider for clarification.

## 2023-10-06 NOTE — ASSESSMENT & PLAN NOTE
Stroke alert presenting with slurred speech, dysphagia and numbness near right eye. Symptoms improving. Has mildly slurred speech. Hx of TIA. · Stroke alert, neurology following. Appreciate recommendations. Recommended aspirin and Plavix load continuing with aspirin 81 mg daily followed by Plavix 75 mg daily  · Reports hx of stroke 4 years ago, not on asa or plavix currently.    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 as described, this could represent acute or chronic occlusion  · MRI brain-no acute ischemic event  · May attempt to gradually achieve normotensive BP  · Speech recommending starting puréed diet and may take pills today, however they acknowledge that patient can only take between 3 and 7 spoonfuls before needing a rest  · Neuro consulted-pending myasthenia gravis blood work, started Mestinon yesterday will increase dose today, will continue to trial and see how patient does, currently believes TIA versus myasthenia gravis

## 2023-10-06 NOTE — SPEECH THERAPY NOTE
Speech Language/Pathology     Speech/Language Pathology Progress Note     Patient Name: Shirley Etienne    QPVAZ'L Date: 10/6/2023     Problem List  Principal Problem:    Stroke-like symptoms  Active Problems:    CKD (chronic kidney disease)    Essential hypertension    History of CVA (cerebrovascular accident)    Hypothyroidism    Hyperlipidemia    HFrEF (heart failure with reduced ejection fraction) ([address])    Dysphagia        Subjective:  Patient received awake and alert. Daughter present. Pt and family express desire to be discharged and return home to [place] for further SLP workup re: dysphagia. Previous/current diet: puree/nectar     Objective:  Daughter reports functional mgmt of puree solids for lunch yesterday. At present patient self feeds puree, nectar, and thin liquids. Multiple swallows remains evident upon palpation w/ delayed coughing, wet vocal quality and expectoration (increased w/ thins today). Throat clear x1 with puree. Pt sips nectar thick w/o s/s of aspiration or distress; multiple audible swallows persist.    Per pt and daughter, swallow presentation was same ~4 years ago when patients was discharged and instructed to follow up for outpatient video and SLP tx. Assessment:  Some gradual improvement in oropharyngeal swallow, though mostly unchanged. Note, Mestinon recently initiated (x1 dose). Given functional mgmt of present/modified diet, unknown etiology re: onset of dysphagia (?hx CVA/TIA v MG), and signs of gradual improvement; pt reccommended to continue on current diet w/ SLP follow up to determine timing/ readiness for diet advancement and MBSS upon further improvement. Based of off s/s at bedside today, pt not yet ready for diet advancement.   Will hold on moving forward with swallow study w/ hopes of ongoing improvement, either with Mestinon or given pt's hx of similar event in the past.      Edu re: thickened liquids and thinned pureed solids provided to daughter who verbalizes understanding as pt has been on this diet before. Plan:  Puree/nectar  Meds crushed in puree  Upright 90'  Monitor for s/s of aspiration   Recommend to continue on modified diet w/ close SLP follow up including MBSS and outpatient tx as indicated- this may be done as an outpatient closer to home.     D/w pt, family, PA      Yolanda Horne, [address] [facility], [address] [facility]  Speech-Language Pathologist  [place] #[id number]  [place] #[id number]

## 2023-10-07 VITALS
HEART RATE: 56 BPM | DIASTOLIC BLOOD PRESSURE: 87 MMHG | WEIGHT: 184.3 LBS | TEMPERATURE: 97.6 F | BODY MASS INDEX: 27.3 KG/M2 | SYSTOLIC BLOOD PRESSURE: 125 MMHG | HEIGHT: 69 IN | OXYGEN SATURATION: 96 % | RESPIRATION RATE: 18 BRPM

## 2023-10-07 PROBLEM — N18.2 STAGE 2 CHRONIC KIDNEY DISEASE: Status: ACTIVE | Noted: 2023-10-01

## 2023-10-07 LAB
ANION GAP SERPL CALCULATED.3IONS-SCNC: 5 MMOL/L
BUN SERPL-MCNC: 27 MG/DL (ref 5–25)
CALCIUM SERPL-MCNC: 8.6 MG/DL (ref 8.4–10.2)
CHLORIDE SERPL-SCNC: 109 MMOL/L (ref 96–108)
CO2 SERPL-SCNC: 27 MMOL/L (ref 21–32)
CREAT SERPL-MCNC: 0.99 MG/DL (ref 0.6–1.3)
DME PARACHUTE DELIVERY DATE REQUESTED: NORMAL
DME PARACHUTE ITEM DESCRIPTION: NORMAL
DME PARACHUTE ORDER STATUS: NORMAL
DME PARACHUTE SUPPLIER NAME: NORMAL
DME PARACHUTE SUPPLIER PHONE: NORMAL
ERYTHROCYTE [DISTWIDTH] IN BLOOD BY AUTOMATED COUNT: 13.3 % (ref 11.6–15.1)
GFR SERPL CREATININE-BSD FRML MDRD: 72 ML/MIN/1.73SQ M
GLUCOSE SERPL-MCNC: 106 MG/DL (ref 65–140)
HCT VFR BLD AUTO: 39.3 % (ref 36.5–49.3)
HGB BLD-MCNC: 13.4 G/DL (ref 12–17)
MCH RBC QN AUTO: 29.3 PG (ref 26.8–34.3)
MCHC RBC AUTO-ENTMCNC: 34.1 G/DL (ref 31.4–37.4)
MCV RBC AUTO: 86 FL (ref 82–98)
PLATELET # BLD AUTO: 217 THOUSANDS/UL (ref 149–390)
PMV BLD AUTO: 10.7 FL (ref 8.9–12.7)
POTASSIUM SERPL-SCNC: 3.3 MMOL/L (ref 3.5–5.3)
RBC # BLD AUTO: 4.58 MILLION/UL (ref 3.88–5.62)
SODIUM SERPL-SCNC: 141 MMOL/L (ref 135–147)
WBC # BLD AUTO: 8.24 THOUSAND/UL (ref 4.31–10.16)

## 2023-10-07 PROCEDURE — 85027 COMPLETE CBC AUTOMATED: CPT

## 2023-10-07 PROCEDURE — 80048 BASIC METABOLIC PNL TOTAL CA: CPT

## 2023-10-07 PROCEDURE — 99232 SBSQ HOSP IP/OBS MODERATE 35: CPT | Performed by: PSYCHIATRY & NEUROLOGY

## 2023-10-07 PROCEDURE — C9113 INJ PANTOPRAZOLE SODIUM, VIA: HCPCS

## 2023-10-07 PROCEDURE — 99239 HOSP IP/OBS DSCHRG MGMT >30: CPT | Performed by: PHYSICIAN ASSISTANT

## 2023-10-07 RX ORDER — PYRIDOSTIGMINE BROMIDE 60 MG/1
60 TABLET ORAL
Qty: 90 TABLET | Refills: 2 | Status: SHIPPED | OUTPATIENT
Start: 2023-10-07

## 2023-10-07 RX ORDER — ASPIRIN 81 MG/1
81 TABLET, CHEWABLE ORAL DAILY
Qty: 30 TABLET | Refills: 0
Start: 2023-10-08

## 2023-10-07 RX ORDER — POTASSIUM CHLORIDE 20 MEQ/1
40 TABLET, EXTENDED RELEASE ORAL ONCE
Status: COMPLETED | OUTPATIENT
Start: 2023-10-07 | End: 2023-10-07

## 2023-10-07 RX ORDER — COLCHICINE 0.6 MG/1
0.6 TABLET ORAL ONCE
Status: COMPLETED | OUTPATIENT
Start: 2023-10-07 | End: 2023-10-07

## 2023-10-07 RX ORDER — ATORVASTATIN CALCIUM 40 MG/1
40 TABLET, FILM COATED ORAL DAILY
Qty: 7 TABLET | Refills: 0 | Status: SHIPPED | OUTPATIENT
Start: 2023-10-07 | End: 2023-10-14

## 2023-10-07 RX ORDER — POTASSIUM CHLORIDE 20MEQ/15ML
40 LIQUID (ML) ORAL ONCE
Status: COMPLETED | OUTPATIENT
Start: 2023-10-07 | End: 2023-10-07

## 2023-10-07 RX ORDER — ATORVASTATIN CALCIUM 40 MG/1
40 TABLET, FILM COATED ORAL EVERY EVENING
Qty: 30 TABLET | Refills: 2 | Status: SHIPPED | OUTPATIENT
Start: 2023-10-07

## 2023-10-07 RX ORDER — PYRIDOSTIGMINE BROMIDE 60 MG/1
60 TABLET ORAL 3 TIMES DAILY
Qty: 21 TABLET | Refills: 0 | Status: SHIPPED | OUTPATIENT
Start: 2023-10-07 | End: 2023-10-14

## 2023-10-07 RX ADMIN — PANTOPRAZOLE SODIUM 40 MG: 40 INJECTION, POWDER, FOR SOLUTION INTRAVENOUS at 08:22

## 2023-10-07 RX ADMIN — LEVOTHYROXINE SODIUM 75 MCG: 75 TABLET ORAL at 05:39

## 2023-10-07 RX ADMIN — HEPARIN SODIUM 5000 UNITS: 5000 INJECTION INTRAVENOUS; SUBCUTANEOUS at 05:39

## 2023-10-07 RX ADMIN — IBUPROFEN 400 MG: 400 TABLET, FILM COATED ORAL at 09:25

## 2023-10-07 RX ADMIN — POTASSIUM CHLORIDE 40 MEQ: 1500 TABLET, EXTENDED RELEASE ORAL at 13:57

## 2023-10-07 RX ADMIN — AMLODIPINE BESYLATE 5 MG: 5 TABLET ORAL at 08:21

## 2023-10-07 RX ADMIN — CARVEDILOL 3.12 MG: 3.12 TABLET, FILM COATED ORAL at 08:21

## 2023-10-07 RX ADMIN — PYRIDOSTIGMINE BROMIDE 60 MG: 60 TABLET ORAL at 12:24

## 2023-10-07 RX ADMIN — PYRIDOSTIGMINE BROMIDE 60 MG: 60 TABLET ORAL at 08:00

## 2023-10-07 RX ADMIN — COLCHICINE 0.6 MG: 0.6 TABLET ORAL at 12:24

## 2023-10-07 RX ADMIN — ASPIRIN 81 MG: 81 TABLET, CHEWABLE ORAL at 08:21

## 2023-10-07 RX ADMIN — IBUPROFEN 400 MG: 400 TABLET, FILM COATED ORAL at 03:13

## 2023-10-07 RX ADMIN — SODIUM CHLORIDE, SODIUM LACTATE, POTASSIUM CHLORIDE, AND CALCIUM CHLORIDE 75 ML/HR: .6; .31; .03; .02 INJECTION, SOLUTION INTRAVENOUS at 01:03

## 2023-10-07 RX ADMIN — POTASSIUM CHLORIDE 40 MEQ: 1.5 SOLUTION ORAL at 12:53

## 2023-10-07 NOTE — ASSESSMENT & PLAN NOTE
· Chronic, elevated -180s on arrival   · Restarted BB, CCB; hold diuretic pending improved intake  · Monitor VS closely   Blood Pressure: 125/87

## 2023-10-07 NOTE — CASE MANAGEMENT
Case Management Discharge Planning Note    Patient name Malvin Avila  Location /-72 MRN [id number]  : 1945 Date 10/7/2023       Current Admission Date: 10/1/2023  Current Admission Diagnosis:Stroke-like symptoms   Patient Active Problem List    Diagnosis Date Noted   • Dysphagia 10/03/2023   • Stroke-like symptoms 10/01/2023   • Stage 2 chronic kidney disease 10/01/2023   • Hyperlipidemia 10/01/2023   • HFrEF (heart failure with reduced ejection fraction) ([address]) 10/01/2023   • Hypothyroidism 2023   • Essential hypertension 2019   • History of CVA (cerebrovascular accident) 2019      LOS (days): 5  Geometric Mean LOS (GMLOS) (days):   Days to GMLOS:     OBJECTIVE:  Risk of Unplanned Readmission Score: 7.9         Current admission status: Inpatient   Preferred Pharmacy:   [address], [address] [place] [address]  Phone: [phone] Fax: [phone] [facility], [id number])  [address] [address]  Phone: [phone] Fax: [phone]    Primary Care Provider: No primary care provider on file. Primary Insurance: [facility]  Secondary Insurance: MEDICARE MISC REPLACEMENT    DISCHARGE DETAILS:                                     DME Referral Provided  Referral made for DME?: Yes  DME referral completed for the following items[de-identified] Christina Jose  DME Supplier Name[de-identified] [facility]    Other Referral/Resources/Interventions Provided:  Interventions: DME  Referral Comments: Pt requesting a walker. CM contacted [employer] and spoke to Burt [employer].   She approves taking a walker from stock closet for transport home  (Pt has [place] insurance along with Medicare)    Would you like to participate in our [employer] service program?  : No - Declined    Treatment Team Recommendation: Home  Discharge Destination Plan[de-identified] Home  Transport at Discharge : Family

## 2023-10-07 NOTE — PROGRESS NOTES
Progress Note - Neurology   Janak Caal 77 y.o. male MRN: [id number]  Unit/Bed#: -01 Encounter: [id number]      Subjective:   Patient seen in follow-up, with advanced practitioner patient's daughter was present at the bedside and has noted significant improvement over the last 24 hours with his speech as well as swallowing ability since Mestinon was increased to 60 mg 3 times a day. He denies any side effects and acetylcholine receptor antibodies binding and blocking were both unremarkable, MuSK antibody and modulating acetylcholine receptor antibodies are pending. Patient anxious to go home, since his swallowing has noted significant improvement patient advised to continue Mestinon at this time. ROS: 12 system cued query was unchanged from org. consult note. Vitals:   Vitals:    10/07/23 0720   BP: 125/87   Pulse: 56   Resp:    Temp: 97.6 °F (36.4 °C)   SpO2: 96%   ,Body mass index is 27.22 kg/m².     MEDS:    Current Facility-Administered Medications:   •  aluminum-magnesium hydroxide-simethicone (MAALOX) oral suspension 30 mL, 30 mL, Oral, Q4H PRN, Sumanth Garcia MD  •  amLODIPine (NORVASC) tablet 5 mg, 5 mg, Oral, Daily, Shantell Muckle Prator, PA-C, 5 mg at 10/07/23 3793  •  aspirin chewable tablet 81 mg, 81 mg, Oral, Daily, Yulia Galina Saraiya, DO, 81 mg at 10/07/23 1804  •  aspirin tablet 325 mg, 325 mg, Oral, Once, Yulia Galina Saraiya, DO  •  atorvastatin (LIPITOR) tablet 40 mg, 40 mg, Oral, QPM, Yulia Galina Gtaiya, DO, 40 mg at 10/06/23 1704  •  bisacodyl (DULCOLAX) rectal suppository 10 mg, 10 mg, Rectal, Daily PRN, Shantell Muckle Prator, PA-C, 10 mg at 10/05/23 1106  •  carvedilol (COREG) tablet 3.125 mg, 3.125 mg, Oral, BID With Meals, Shantell Muckle Prator, PA-C, 3.125 mg at 10/07/23 0436  •  clopidogrel (PLAVIX) tablet 300 mg, 300 mg, Oral, Once, Yulia Miller DO  •  hydrALAZINE (APRESOLINE) injection 5 mg, 5 mg, Intravenous, Q6H PRN, Shantell Marcano PA-C, 5 mg at 10/04/23 0057  • ibuprofen (MOTRIN) tablet 400 mg, 400 mg, Oral, Q6H PRN, Marc Goldmann Prator, PA-C, 400 mg at 10/07/23 1798  •  lactated ringers infusion, 75 mL/hr, Intravenous, Continuous, Marc Goldmann Prator, PA-C, Last Rate: 75 mL/hr at 10/07/23 0103, 75 mL/hr at 10/07/23 0103  •  levothyroxine tablet 75 mcg, 75 mcg, Oral, Early Morning, Yulia Miller DO, 75 mcg at 10/07/23 0539  •  pantoprazole (PROTONIX) injection 40 mg, 40 mg, Intravenous, Q24H [facility], Marc Goldmann Prator, PA-C, 40 mg at 10/07/23 4618  •  pyridostigmine (MESTINON) tablet 60 mg, 60 mg, Oral, TID AC, Marc Goldmann Prator, PA-C, 60 mg at 10/07/23 1224    Physical Exam:  General appearance: alert, appears stated age and cooperative  Head: Normocephalic, without obvious abnormality, atraumatic    Neurologic:  His neurological exam reveals significant resolution of his dysarthria, palate moves in the midline, and no other new deficits were noted on cranial nerve, motor and sensory exam.    Lab Results: I have personally reviewed pertinent reports. Imaging Studies: I have personally reviewed pertinent films in PACS      Assessment:  1. 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 No evidence of any brainstem CVA on imaging studies. Plan:  Patient and his daughter were counseled at length regarding seeing a neurologist in [place] for further evaluation and confirmation of diagnosis. Patient did have a similar episode 4 years ago and was felt to have a brainstem stroke. Continue aspirin/statin therapy, adequate blood pressure and blood sugar control, and patient advised to continue Mestinon 60 mg 3 times a day till he sees a neurologist.    Counseling / Coordination of Care  Total time spent today 25 minutes. Greater than 50% of total time was spent with the patient and / or family counseling and / or coordination of care.       10/7/2023,2:30 PM    Dictation voice to text software has been used in the creation of this document. Please consider this in light of any contextual or grammatical errors.

## 2023-10-07 NOTE — PLAN OF CARE
Problem: MOBILITY - ADULT  Goal: Maintain or return to baseline ADL function  Description: INTERVENTIONS:  -  Assess patient's ability to carry out ADLs; assess patient's baseline for ADL function and identify physical deficits which impact ability to perform ADLs (bathing, care of mouth/teeth, toileting, grooming, dressing, etc.)  - Assess/evaluate cause of self-care deficits   - Assess range of motion  - Assess patient's mobility; develop plan if impaired  - Assess patient's need for assistive devices and provide as appropriate  - Encourage maximum independence but intervene and supervise when necessary  - Involve family in performance of ADLs  - Assess for home care needs following discharge   - Consider OT consult to assist with ADL evaluation and planning for discharge  - Provide patient education as appropriate  Outcome: Progressing  Goal: Maintains/Returns to pre admission functional level  Description: INTERVENTIONS:  - Perform BMAT or MOVE assessment daily.   - Set and communicate daily mobility goal to care team and patient/family/caregiver. - Collaborate with rehabilitation services on mobility goals if consulted  - Perform Range of Motion    times a day. - Reposition patient every    hours.   - Dangle patient      times a day  - Stand patient    times a day  - Ambulate patient    times a day  - Out of bed to chair    times a day   - Out of bed for meals    times a day  - Out of bed for toileting  - Record patient progress and toleration of activity level   Outcome: Progressing     Problem: PAIN - ADULT  Goal: Verbalizes/displays adequate comfort level or baseline comfort level  Description: Interventions:  - Encourage patient to monitor pain and request assistance  - Assess pain using appropriate pain scale  - Administer analgesics based on type and severity of pain and evaluate response  - Implement non-pharmacological measures as appropriate and evaluate response  - Consider cultural and social influences on pain and pain management  - Notify physician/advanced practitioner if interventions unsuccessful or patient reports new pain  Outcome: Progressing     Problem: INFECTION - ADULT  Goal: Absence or prevention of progression during hospitalization  Description: INTERVENTIONS:  - Assess and monitor for signs and symptoms of infection  - Monitor lab/diagnostic results  - Monitor all insertion sites, i.e. indwelling lines, tubes, and drains  - Monitor endotracheal if appropriate and nasal secretions for changes in amount and color  - [place] appropriate cooling/warming therapies per order  - Administer medications as ordered  - Instruct and encourage patient and family to use good hand hygiene technique  - Identify and instruct in appropriate isolation precautions for identified infection/condition  Outcome: Progressing  Goal: Absence of fever/infection during neutropenic period  Description: INTERVENTIONS:  - Monitor WBC    Outcome: Progressing     Problem: SAFETY ADULT  Goal: Maintain or return to baseline ADL function  Description: INTERVENTIONS:  -  Assess patient's ability to carry out ADLs; assess patient's baseline for ADL function and identify physical deficits which impact ability to perform ADLs (bathing, care of mouth/teeth, toileting, grooming, dressing, etc.)  - Assess/evaluate cause of self-care deficits   - Assess range of motion  - Assess patient's mobility; develop plan if impaired  - Assess patient's need for assistive devices and provide as appropriate  - Encourage maximum independence but intervene and supervise when necessary  - Involve family in performance of ADLs  - Assess for home care needs following discharge   - Consider OT consult to assist with ADL evaluation and planning for discharge  - Provide patient education as appropriate  Outcome: Progressing  Goal: Maintains/Returns to pre admission functional level  Description: INTERVENTIONS:  - Perform BMAT or MOVE assessment daily.   - Set and communicate daily mobility goal to care team and patient/family/caregiver. - Collaborate with rehabilitation services on mobility goals if consulted  - Perform Range of Motion      times a day. - Reposition patient every    hours.   - Dangle patient      times a day  - Stand patient    times a day  - Ambulate patient    times a day  - Out of bed to chair    times a day   - Out of bed for meals      times a day  - Out of bed for toileting  - Record patient progress and toleration of activity level   Outcome: Progressing  Goal: Patient will remain free of falls  Description: INTERVENTIONS:  - Educate patient/family on patient safety including physical limitations  - Instruct patient to call for assistance with activity   - Consult OT/PT to assist with strengthening/mobility   - Keep Call bell within reach  - Keep bed low and locked with side rails adjusted as appropriate  - Keep care items and personal belongings within reach  - Initiate and maintain comfort rounds  - Make Fall Risk Sign visible to staff  - Offer Toileting every    Hours, in advance of need  - Initiate/Maintain   alarm  - Obtain necessary fall risk management equipment:   - Apply yellow socks and bracelet for high fall risk patients  - Consider moving patient to room near nurses station  Outcome: Progressing     Problem: DISCHARGE PLANNING  Goal: Discharge to home or other facility with appropriate resources  Description: INTERVENTIONS:  - Identify barriers to discharge w/patient and caregiver  - Arrange for needed discharge resources and transportation as appropriate  - Identify discharge learning needs (meds, wound care, etc.)  - Arrange for interpretive services to assist at discharge as needed  - Refer to Case Management Department for coordinating discharge planning if the patient needs post-hospital services based on physician/advanced practitioner order or complex needs related to functional status, cognitive ability, or social support system  Outcome: Progressing     Problem: Knowledge Deficit  Goal: Patient/family/caregiver demonstrates understanding of disease process, treatment plan, medications, and discharge instructions  Description: Complete learning assessment and assess knowledge base. Interventions:  - Provide teaching at level of understanding  - Provide teaching via preferred learning methods  Outcome: Progressing     Problem: Prexisting or High Potential for Compromised Skin Integrity  Goal: Skin integrity is maintained or improved  Description: INTERVENTIONS:  - Identify patients at risk for skin breakdown  - Assess and monitor skin integrity  - Assess and monitor nutrition and hydration status  - Monitor labs   - Assess for incontinence   - Turn and reposition patient  - Assist with mobility/ambulation  - Relieve pressure over bony prominences  - Avoid friction and shearing  - Provide appropriate hygiene as needed including keeping skin clean and dry  - Evaluate need for skin moisturizer/barrier cream  - Collaborate with interdisciplinary team   - Patient/family teaching  - Consider wound care consult   Outcome: Progressing     Problem: Nutrition/Hydration-ADULT  Goal: Nutrient/Hydration intake appropriate for improving, restoring or maintaining nutritional needs  Description: Monitor and assess patient's nutrition/hydration status for malnutrition. Collaborate with interdisciplinary team and initiate plan and interventions as ordered. Monitor patient's weight and dietary intake as ordered or per policy. Utilize nutrition screening tool and intervene as necessary. Determine patient's food preferences and provide high-protein, high-caloric foods as appropriate.      INTERVENTIONS:  - Monitor oral intake, urinary output, labs, and treatment plans  - Assess nutrition and hydration status and recommend course of action  - Evaluate amount of meals eaten  - Assist patient with eating if necessary   - Allow adequate time for meals  - Recommend/ encourage appropriate diets, oral nutritional supplements, and vitamin/mineral supplements  - Order, calculate, and assess calorie counts as needed  - Recommend, monitor, and adjust tube feedings and TPN/PPN based on assessed needs  - Assess need for intravenous fluids  - Provide specific nutrition/hydration education as appropriate  - Include patient/family/caregiver in decisions related to nutrition  Outcome: Progressing     Problem: Neurological Deficit  Goal: Neurological status is stable or improving  Description: Interventions:  - Monitor and assess patient's level of consciousness, motor function, sensory function, and level of assistance needed for ADLs. - Monitor and report changes from baseline. Collaborate with interdisciplinary team to initiate plan and implement interventions as ordered. - Provide and maintain a safe environment. - Consider seizure precautions. - Consider fall precautions. - Consider aspiration precautions. - Consider bleeding precautions. Outcome: Progressing     Problem: Activity Intolerance/Impaired Mobility  Goal: Mobility/activity is maintained at optimum level for patient  Description: Interventions:  - Assess and monitor patient  barriers to mobility and need for assistive/adaptive devices. - Assess patient's emotional response to limitations. - Collaborate with interdisciplinary team and initiate plans and interventions as ordered. - Encourage independent activity per ability.  - Maintain proper body alignment. - Perform active/passive rom as tolerated/ordered. - Plan activities to conserve energy.  - Turn patient as appropriate  Outcome: Progressing     Problem: Communication Impairment  Goal: Ability to express needs and understand communication  Description: Assess patient's communication skills and ability to understand information.   Patient will demonstrate use of effective communication techniques, alternative methods of communication and understanding even if not able to speak. - Encourage communication and provide alternate methods of communication as needed. - Collaborate with case management/ for discharge needs. - Include patient/family/caregiver in decisions related to communication. Outcome: Progressing     Problem: Potential for Aspiration  Goal: Non-ventilated patient's risk of aspiration is minimized  Description: Assess and monitor vital signs, respiratory status, and labs (WBC). Monitor for signs of aspiration (tachypnea, cough, rales, wheezing, cyanosis, fever). - Assess and monitor patient's ability to swallow. - Place patient up in chair to eat if possible. - HOB up at 90 degrees to eat if unable to get patient up into chair.  - Supervise patient during oral intake. - Instruct patient/ family to take small bites. - Instruct patient/ family to take small single sips when taking liquids. - Follow patient-specific strategies generated by speech pathologist.  Outcome: Progressing  Goal: Ventilated patient's risk of aspiration is minimized  Description: Assess and monitor vital signs, respiratory status, airway cuff pressure, and labs (WBC). Monitor for signs of aspiration (tachypnea, cough, rales, wheezing, cyanosis, fever). - Elevate head of bed 30 degrees if patient has tube feeding.  - Monitor tube feeding. Outcome: Progressing     Problem: Nutrition  Goal: Nutrition/Hydration status is improving  Description: Monitor and assess patient's nutrition/hydration status for malnutrition (ex- brittle hair, bruises, dry skin, pale skin and conjunctiva, muscle wasting, smooth red tongue, and disorientation). Collaborate with interdisciplinary team and initiate plan and interventions as ordered. Monitor patient's weight and dietary intake as ordered or per policy. Utilize nutrition screening tool and intervene per policy.  Determine patient's food preferences and provide high-protein, high-caloric foods as appropriate. - Assist patient with eating.  - Allow adequate time for meals.  - Encourage patient to take dietary supplement as ordered. - Collaborate with clinical nutritionist.  - Include patient/family/caregiver in decisions related to nutrition.   Outcome: Progressing

## 2023-10-07 NOTE — ASSESSMENT & PLAN NOTE
· History of, echo 2019 LVEF 37%; on Lasix 20 mg daily [reports not taking]  · Currently appears euvolemic  · Monitor I/Os, daily weights  · BMP stable BUN/Cr  · Echo 10/2/23:  LVEF 50%, G1DD, limited bubble study negative

## 2023-10-07 NOTE — PLAN OF CARE
Problem: MOBILITY - ADULT  Goal: Maintain or return to baseline ADL function  Description: INTERVENTIONS:  -  Assess patient's ability to carry out ADLs; assess patient's baseline for ADL function and identify physical deficits which impact ability to perform ADLs (bathing, care of mouth/teeth, toileting, grooming, dressing, etc.)  - Assess/evaluate cause of self-care deficits   - Assess range of motion  - Assess patient's mobility; develop plan if impaired  - Assess patient's need for assistive devices and provide as appropriate  - Encourage maximum independence but intervene and supervise when necessary  - Involve family in performance of ADLs  - Assess for home care needs following discharge   - Consider OT consult to assist with ADL evaluation and planning for discharge  - Provide patient education as appropriate  Outcome: Progressing  Goal: Maintains/Returns to pre admission functional level  Description: INTERVENTIONS:  - Perform BMAT or MOVE assessment daily.   - Set and communicate daily mobility goal to care team and patient/family/caregiver. - Collaborate with rehabilitation services on mobility goals if consulted  - Perform Range of Motion 4 times a day. - Reposition patient every 4 hours.   - Dangle patient 4 times a day  - Stand patient 4 times a day  - Ambulate patient 4 times a day  - Out of bed to chair 3 times a day   - Out of bed for meals 3 times a day  - Out of bed for toileting  - Record patient progress and toleration of activity level   Outcome: Progressing     Problem: PAIN - ADULT  Goal: Verbalizes/displays adequate comfort level or baseline comfort level  Description: Interventions:  - Encourage patient to monitor pain and request assistance  - Assess pain using appropriate pain scale  - Administer analgesics based on type and severity of pain and evaluate response  - Implement non-pharmacological measures as appropriate and evaluate response  - Consider cultural and social influences on pain and pain management  - Notify physician/advanced practitioner if interventions unsuccessful or patient reports new pain  Outcome: Progressing     Problem: INFECTION - ADULT  Goal: Absence or prevention of progression during hospitalization  Description: INTERVENTIONS:  - Assess and monitor for signs and symptoms of infection  - Monitor lab/diagnostic results  - Monitor all insertion sites, i.e. indwelling lines, tubes, and drains  - Monitor endotracheal if appropriate and nasal secretions for changes in amount and color  - [place] appropriate cooling/warming therapies per order  - Administer medications as ordered  - Instruct and encourage patient and family to use good hand hygiene technique  - Identify and instruct in appropriate isolation precautions for identified infection/condition  Outcome: Progressing  Goal: Absence of fever/infection during neutropenic period  Description: INTERVENTIONS:  - Monitor WBC    Outcome: Progressing     Problem: SAFETY ADULT  Goal: Maintain or return to baseline ADL function  Description: INTERVENTIONS:  -  Assess patient's ability to carry out ADLs; assess patient's baseline for ADL function and identify physical deficits which impact ability to perform ADLs (bathing, care of mouth/teeth, toileting, grooming, dressing, etc.)  - Assess/evaluate cause of self-care deficits   - Assess range of motion  - Assess patient's mobility; develop plan if impaired  - Assess patient's need for assistive devices and provide as appropriate  - Encourage maximum independence but intervene and supervise when necessary  - Involve family in performance of ADLs  - Assess for home care needs following discharge   - Consider OT consult to assist with ADL evaluation and planning for discharge  - Provide patient education as appropriate  Outcome: Progressing  Goal: Maintains/Returns to pre admission functional level  Description: INTERVENTIONS:  - Perform BMAT or MOVE assessment daily.   - Set and communicate daily mobility goal to care team and patient/family/caregiver. - Collaborate with rehabilitation services on mobility goals if consulted  - Perform Range of Motion 4 times a day. - Reposition patient every 4 hours.   - Dangle patient 4 times a day  - Stand patient 4 times a day  - Ambulate patient 4 times a day  - Out of bed to chair 3 times a day   - Out of bed for meals 3 times a day  - Out of bed for toileting  - Record patient progress and toleration of activity level   Outcome: Progressing  Goal: Patient will remain free of falls  Description: INTERVENTIONS:  - Educate patient/family on patient safety including physical limitations  - Instruct patient to call for assistance with activity   - Consult OT/PT to assist with strengthening/mobility   - Keep Call bell within reach  - Keep bed low and locked with side rails adjusted as appropriate  - Keep care items and personal belongings within reach  - Initiate and maintain comfort rounds  - Make Fall Risk Sign visible to staff  - Offer Toileting every 2 Hours, in advance of need  - Initiate/Maintain bed alarm  - Obtain necessary fall risk management equipment: bed alarm, nonskid socks  - Apply yellow socks and bracelet for high fall risk patients  - Consider moving patient to room near nurses station  Outcome: Progressing     Problem: DISCHARGE PLANNING  Goal: Discharge to home or other facility with appropriate resources  Description: INTERVENTIONS:  - Identify barriers to discharge w/patient and caregiver  - Arrange for needed discharge resources and transportation as appropriate  - Identify discharge learning needs (meds, wound care, etc.)  - Arrange for interpretive services to assist at discharge as needed  - Refer to Case Management Department for coordinating discharge planning if the patient needs post-hospital services based on physician/advanced practitioner order or complex needs related to functional status, cognitive ability, or social support system  Outcome: Progressing     Problem: Knowledge Deficit  Goal: Patient/family/caregiver demonstrates understanding of disease process, treatment plan, medications, and discharge instructions  Description: Complete learning assessment and assess knowledge base. Interventions:  - Provide teaching at level of understanding  - Provide teaching via preferred learning methods  Outcome: Progressing     Problem: Prexisting or High Potential for Compromised Skin Integrity  Goal: Skin integrity is maintained or improved  Description: INTERVENTIONS:  - Identify patients at risk for skin breakdown  - Assess and monitor skin integrity  - Assess and monitor nutrition and hydration status  - Monitor labs   - Assess for incontinence   - Turn and reposition patient  - Assist with mobility/ambulation  - Relieve pressure over bony prominences  - Avoid friction and shearing  - Provide appropriate hygiene as needed including keeping skin clean and dry  - Evaluate need for skin moisturizer/barrier cream  - Collaborate with interdisciplinary team   - Patient/family teaching  - Consider wound care consult   Outcome: Progressing     Problem: Nutrition/Hydration-ADULT  Goal: Nutrient/Hydration intake appropriate for improving, restoring or maintaining nutritional needs  Description: Monitor and assess patient's nutrition/hydration status for malnutrition. Collaborate with interdisciplinary team and initiate plan and interventions as ordered. Monitor patient's weight and dietary intake as ordered or per policy. Utilize nutrition screening tool and intervene as necessary. Determine patient's food preferences and provide high-protein, high-caloric foods as appropriate.      INTERVENTIONS:  - Monitor oral intake, urinary output, labs, and treatment plans  - Assess nutrition and hydration status and recommend course of action  - Evaluate amount of meals eaten  - Assist patient with eating if necessary   - Allow adequate time for meals  - Recommend/ encourage appropriate diets, oral nutritional supplements, and vitamin/mineral supplements  - Order, calculate, and assess calorie counts as needed  - Recommend, monitor, and adjust tube feedings and TPN/PPN based on assessed needs  - Assess need for intravenous fluids  - Provide specific nutrition/hydration education as appropriate  - Include patient/family/caregiver in decisions related to nutrition  Outcome: Progressing     Problem: Neurological Deficit  Goal: Neurological status is stable or improving  Description: Interventions:  - Monitor and assess patient's level of consciousness, motor function, sensory function, and level of assistance needed for ADLs. - Monitor and report changes from baseline. Collaborate with interdisciplinary team to initiate plan and implement interventions as ordered. - Provide and maintain a safe environment. - Consider seizure precautions. - Consider fall precautions. - Consider aspiration precautions. - Consider bleeding precautions. Outcome: Progressing     Problem: Activity Intolerance/Impaired Mobility  Goal: Mobility/activity is maintained at optimum level for patient  Description: Interventions:  - Assess and monitor patient  barriers to mobility and need for assistive/adaptive devices. - Assess patient's emotional response to limitations. - Collaborate with interdisciplinary team and initiate plans and interventions as ordered. - Encourage independent activity per ability.  - Maintain proper body alignment. - Perform active/passive rom as tolerated/ordered. - Plan activities to conserve energy.  - Turn patient as appropriate  Outcome: Progressing     Problem: Communication Impairment  Goal: Ability to express needs and understand communication  Description: Assess patient's communication skills and ability to understand information.   Patient will demonstrate use of effective communication techniques, alternative methods of communication and understanding even if not able to speak. - Encourage communication and provide alternate methods of communication as needed. - Collaborate with case management/ for discharge needs. - Include patient/family/caregiver in decisions related to communication. Outcome: Progressing     Problem: Potential for Aspiration  Goal: Non-ventilated patient's risk of aspiration is minimized  Description: Assess and monitor vital signs, respiratory status, and labs (WBC). Monitor for signs of aspiration (tachypnea, cough, rales, wheezing, cyanosis, fever). - Assess and monitor patient's ability to swallow. - Place patient up in chair to eat if possible. - HOB up at 90 degrees to eat if unable to get patient up into chair.  - Supervise patient during oral intake. - Instruct patient/ family to take small bites. - Instruct patient/ family to take small single sips when taking liquids. - Follow patient-specific strategies generated by speech pathologist.  Outcome: Progressing  Goal: Ventilated patient's risk of aspiration is minimized  Description: Assess and monitor vital signs, respiratory status, airway cuff pressure, and labs (WBC). Monitor for signs of aspiration (tachypnea, cough, rales, wheezing, cyanosis, fever). - Elevate head of bed 30 degrees if patient has tube feeding.  - Monitor tube feeding. Outcome: Progressing     Problem: Nutrition  Goal: Nutrition/Hydration status is improving  Description: Monitor and assess patient's nutrition/hydration status for malnutrition (ex- brittle hair, bruises, dry skin, pale skin and conjunctiva, muscle wasting, smooth red tongue, and disorientation). Collaborate with interdisciplinary team and initiate plan and interventions as ordered. Monitor patient's weight and dietary intake as ordered or per policy. Utilize nutrition screening tool and intervene per policy.  Determine patient's food preferences and provide high-protein, high-caloric foods as appropriate. - Assist patient with eating.  - Allow adequate time for meals.  - Encourage patient to take dietary supplement as ordered. - Collaborate with clinical nutritionist.  - Include patient/family/caregiver in decisions related to nutrition.   Outcome: Progressing

## 2023-10-07 NOTE — ASSESSMENT & PLAN NOTE
· Present on admission, likely secondary to #2  · SLP following: puréed diet with nectar thick liquids, aspiration precautions   · Neuro following: continue with myasthenia gravis work-up  · Improving

## 2023-10-07 NOTE — DISCHARGE SUMMARY
[address]  Discharge- Amy Mullen [birth year], 77 y.o. male MRN: [id number]  Unit/Bed#: -[facility] Encounter: [id number]  Primary Care Provider: No primary care provider on file. Date and time admitted to hospital: 10/1/2023  8:40 PM    Dysphagia  Assessment & Plan  · Present on admission, likely secondary to #2  · SLP following: puréed diet with nectar thick liquids, aspiration precautions   · Neuro following: continue with myasthenia gravis work-up  · Improving     * Stroke-like symptoms  Assessment & Plan  Stroke alert presenting with slurred speech, dysphagia and numbness near right eye. Symptoms improving. Has mildly slurred speech.   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 as described, this could represent acute or chronic occlusion  · CT head: No acute abnormality   · MRI brain: Negative   · Neurology consulted,  · TIA vs MRI-negative CVA vs myasthenia   · Aspirin and Plavix load was recommended  · Lipitor 40 mg daily, Aspirin 81 mg daily   · Follow up myasthenia gravis blood work  · Started Mestinon, continue 60 mg TIDAC      History of CVA (cerebrovascular accident)  Assessment & Plan  · History of, not on secondary preventative medications prior to hospitalization; see above     Essential hypertension  Assessment & Plan  · Chronic, elevated -180s on arrival   · Restarted BB, CCB; hold diuretic pending improved intake  · Monitor VS closely   Blood Pressure: 125/87    HFrEF (heart failure with reduced ejection fraction) ([address])  Assessment & Plan  · History of, echo 2019 LVEF 37%; on Lasix 20 mg daily [reports not taking]  · Currently appears euvolemic  · Monitor I/Os, daily weights  · BMP stable BUN/Cr  · Echo 10/2/23:  LVEF 50%, G1DD, limited bubble study negative     Stage 2 chronic kidney disease  Assessment & Plan  · Per chart review creatinine appears to be below baseline  · Last outpatient labs reveal Cr 1.2, eGFR 58  · Avoid hypotension, nephrotoxins  · Continue to monitor BMP due to hypokalemia in setting of poor oral intake     Hyperlipidemia  Assessment & Plan  · Lipid panel shows Tchol 160, , HDL 41, TG 81  · Started on atorvastatin 40 mg     Hypothyroidism  Assessment & Plan  · TSH elevated 5.252, free T4 wnl; consider subclinical  · Repeat TFTs outpatient in 4-6 weeks       Medical Problems     Resolved Problems  Date Reviewed: 10/7/2023   None       Discharging Physician / Practitioner: Rodrigo Sahu PA-C  PCP: No primary care provider on file. Admission Date:   Admission Orders (From admission, onward)     Ordered        10/02/23 1424  Inpatient Admission  Once            10/01/23 2221  Place in Observation  Once                      Discharge Date: 10/07/23    Consultations During Hospital Stay:  [address]  IP CONSULT TO PHYSICAL MEDICINE REHAB  IP CONSULT TO NEUROLOGY  IP CONSULT TO CASE MANAGEMENT  · IP CONSULT TO NUTRITION SERVICES     Procedures Performed:   · None     Significant Findings / Test Results:   · ACTHr-blocking negative  · ACTHr-binding negative   · TSH elevated, FT4 normal  · A1c 5.8%  · Lipid panel mildly elevated     Incidental Findings:   ·      Test Results Pending at Discharge (will require follow up):   · MUSK Ab  · AChR-modulating Ab     Outpatient Tests Requested:  · Neuoromuscular neurology follow up  · PT/OT/SLP therapy outpatient     Complications:  None     Reason for Admission: rule out stroke     Hospital Course:   Juliana Powell is a 77 y.o. male patient who originally presented to the hospital on 10/1/2023 due to slurred speech and dysphagia. Patient does have a medical history that includes stroke/TIA, heart failure, CKD 3. Visiting here with daughter for a convention locally when symptoms started. Workup as above, no definitive answer for symptoms. Consider MRI negative stroke, consider myasthenia gravis.  He will need close follow up outpatient. At present time, he is hemodynamically stable, tolerating mechanical soft diet with nectar thickened liquids, and is comfortable with discharge to his daughter's care. They will hope to leave to return to [place] tomorrow, thus we will provide 7 days of Rx locally, and then send additional Rx to his pharmacy in [place]. Please see above list of diagnoses and related plan for additional information. Condition at Discharge: good    Discharge Day Visit / Exam:   Subjective:  Patient seen sitting up in bed, doing well. Tolerating current level diet without significant issues. No fevers or chills overnight. No new neurologic symptoms. Vitals: Blood Pressure: 125/87 (10/07/23 0720)  Pulse: 56 (10/07/23 0720)  Temperature: 97.6 °F (36.4 °C) (10/07/23 0720)  Temp Source: Oral (10/06/23 1900)  Respirations: 18 (10/06/23 2231)  Height: 5' 9" (175.3 cm) (10/02/23 1140)  Weight - Scale: 83.6 kg (184 lb 4.9 oz) (standing scale) (10/06/23 1744)  SpO2: 96 % (10/07/23 0720)  Exam:   Physical Exam  Vitals and nursing note reviewed. Constitutional:       General: He is awake. He is not in acute distress. Appearance: Normal appearance. He is well-developed and well-groomed. He is not ill-appearing or toxic-appearing. Cardiovascular:      Rate and Rhythm: Normal rate and regular rhythm. Heart sounds: Normal heart sounds. No murmur heard. Pulmonary:      Effort: Pulmonary effort is normal. No respiratory distress. Breath sounds: Normal breath sounds. Abdominal:      General: Bowel sounds are normal. There is no distension. Palpations: Abdomen is soft. Neurological:      Mental Status: He is alert and oriented to person, place, and time. Psychiatric:         Mood and Affect: Mood normal.         Behavior: Behavior normal. Behavior is cooperative. Discussion with Family: Updated  (daughter) at bedside.     Discharge instructions/Information to patient and family:   See after visit summary for information provided to patient and family. Provisions for Follow-Up Care:  See after visit summary for information related to follow-up care and any pertinent home health orders. Disposition:   Home    Planned Readmission: no     Discharge Statement:  I spent >60 minutes discharging the patient. This time was spent on the day of discharge. I had direct contact with the patient on the day of discharge. Greater than 50% of the total time was spent examining patient, answering all patient questions, arranging and discussing plan of care with patient as well as directly providing post-discharge instructions. Additional time then spent on discharge activities. Discharge Medications:  See after visit summary for reconciled discharge medications provided to patient and/or family.       **Please Note: This note may have been constructed using a voice recognition system**

## 2023-10-07 NOTE — ASSESSMENT & PLAN NOTE
· Per chart review creatinine appears to be below baseline  · Last outpatient labs reveal Cr 1.2, eGFR 58  · Avoid hypotension, nephrotoxins  · Continue to monitor BMP due to hypokalemia in setting of poor oral intake

## 2023-10-07 NOTE — ASSESSMENT & PLAN NOTE
Stroke alert presenting with slurred speech, dysphagia and numbness near right eye. Symptoms improving. Has mildly slurred speech.   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

## 2023-10-09 LAB — ACHR MOD AB/ACHR TOTAL SFR SER: 0 % (ref 0–45)

## 2023-10-12 LAB
DME PARACHUTE DELIVERY DATE ACTUAL: NORMAL
DME PARACHUTE DELIVERY DATE REQUESTED: NORMAL
DME PARACHUTE ITEM DESCRIPTION: NORMAL
DME PARACHUTE ORDER STATUS: NORMAL
DME PARACHUTE SUPPLIER NAME: NORMAL
DME PARACHUTE SUPPLIER PHONE: NORMAL
MUSK AB SER IA-ACNC: <1 U/ML